# Patient Record
Sex: MALE | Race: WHITE | NOT HISPANIC OR LATINO | Employment: FULL TIME | URBAN - METROPOLITAN AREA
[De-identification: names, ages, dates, MRNs, and addresses within clinical notes are randomized per-mention and may not be internally consistent; named-entity substitution may affect disease eponyms.]

---
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
## 2019-02-04 ENCOUNTER — TELEPHONE (OUTPATIENT)
Dept: FAMILY MEDICINE CLINIC | Facility: CLINIC | Age: 19
End: 2019-02-04

## 2019-02-04 DIAGNOSIS — F90.0 ATTENTION DEFICIT HYPERACTIVITY DISORDER (ADHD), PREDOMINANTLY INATTENTIVE TYPE: Primary | ICD-10-CM

## 2019-02-04 RX ORDER — METHYLPHENIDATE HYDROCHLORIDE 27 MG/1
27 TABLET ORAL DAILY
Qty: 30 TABLET | Refills: 0 | Status: SHIPPED | OUTPATIENT
Start: 2019-02-04 | End: 2019-04-01

## 2019-02-04 NOTE — TELEPHONE ENCOUNTER
Please call pt re rx for adhd  Pt states psychologist was to have sent records to you to review the patient states that once reviewed you would rx his meds   Pt # [phone] or home # [phone]

## 2019-04-01 ENCOUNTER — OFFICE VISIT (OUTPATIENT)
Dept: FAMILY MEDICINE CLINIC | Facility: CLINIC | Age: 19
End: 2019-04-01
Payer: COMMERCIAL

## 2019-04-01 VITALS
DIASTOLIC BLOOD PRESSURE: 76 MMHG | OXYGEN SATURATION: 100 % | RESPIRATION RATE: 18 BRPM | HEART RATE: 88 BPM | HEIGHT: 70 IN | WEIGHT: 146.38 LBS | SYSTOLIC BLOOD PRESSURE: 118 MMHG | BODY MASS INDEX: 20.96 KG/M2

## 2019-04-01 DIAGNOSIS — F90.0 ATTENTION DEFICIT HYPERACTIVITY DISORDER (ADHD), PREDOMINANTLY INATTENTIVE TYPE: ICD-10-CM

## 2019-04-01 DIAGNOSIS — R41.840 ATTENTION AND CONCENTRATION DEFICIT: Primary | ICD-10-CM

## 2019-04-01 PROCEDURE — 99213 OFFICE O/P EST LOW 20 MIN: CPT | Performed by: NURSE PRACTITIONER

## 2019-04-01 PROCEDURE — 1036F TOBACCO NON-USER: CPT | Performed by: NURSE PRACTITIONER

## 2019-04-01 PROCEDURE — 3008F BODY MASS INDEX DOCD: CPT | Performed by: NURSE PRACTITIONER

## 2019-04-01 RX ORDER — METHYLPHENIDATE HYDROCHLORIDE 54 MG/1
54 TABLET ORAL DAILY
Qty: 30 TABLET | Refills: 0 | Status: SHIPPED | OUTPATIENT
Start: 2019-04-01 | End: 2019-05-08 | Stop reason: SDUPTHER

## 2019-05-08 ENCOUNTER — OFFICE VISIT (OUTPATIENT)
Dept: FAMILY MEDICINE CLINIC | Facility: CLINIC | Age: 19
End: 2019-05-08
Payer: COMMERCIAL

## 2019-05-08 VITALS
HEART RATE: 72 BPM | HEIGHT: 70 IN | OXYGEN SATURATION: 98 % | RESPIRATION RATE: 20 BRPM | BODY MASS INDEX: 20.76 KG/M2 | SYSTOLIC BLOOD PRESSURE: 122 MMHG | WEIGHT: 145 LBS | DIASTOLIC BLOOD PRESSURE: 72 MMHG

## 2019-05-08 DIAGNOSIS — R41.840 ATTENTION AND CONCENTRATION DEFICIT: ICD-10-CM

## 2019-05-08 DIAGNOSIS — F90.0 ATTENTION DEFICIT HYPERACTIVITY DISORDER (ADHD), PREDOMINANTLY INATTENTIVE TYPE: Primary | ICD-10-CM

## 2019-05-08 DIAGNOSIS — G47.9 SLEEP DISTURBANCE: ICD-10-CM

## 2019-05-08 PROCEDURE — 3008F BODY MASS INDEX DOCD: CPT | Performed by: NURSE PRACTITIONER

## 2019-05-08 PROCEDURE — 1036F TOBACCO NON-USER: CPT | Performed by: NURSE PRACTITIONER

## 2019-05-08 PROCEDURE — 99213 OFFICE O/P EST LOW 20 MIN: CPT | Performed by: NURSE PRACTITIONER

## 2019-05-08 RX ORDER — METHYLPHENIDATE HYDROCHLORIDE 54 MG/1
54 TABLET ORAL DAILY
Qty: 30 TABLET | Refills: 0 | Status: SHIPPED | OUTPATIENT
Start: 2019-05-08 | End: 2019-08-06 | Stop reason: SDUPTHER

## 2019-08-06 DIAGNOSIS — R41.840 ATTENTION AND CONCENTRATION DEFICIT: ICD-10-CM

## 2019-08-07 RX ORDER — METHYLPHENIDATE HYDROCHLORIDE 54 MG/1
54 TABLET ORAL DAILY
Qty: 30 TABLET | Refills: 0 | Status: SHIPPED | OUTPATIENT
Start: 2019-08-07 | End: 2020-08-03

## 2020-07-31 ENCOUNTER — OFFICE VISIT (OUTPATIENT)
Dept: URGENT CARE | Facility: MEDICAL CENTER | Age: 20
End: 2020-07-31
Payer: COMMERCIAL

## 2020-07-31 ENCOUNTER — APPOINTMENT (OUTPATIENT)
Dept: RADIOLOGY | Facility: MEDICAL CENTER | Age: 20
End: 2020-07-31
Payer: COMMERCIAL

## 2020-07-31 VITALS
TEMPERATURE: 98.4 F | DIASTOLIC BLOOD PRESSURE: 71 MMHG | WEIGHT: 155 LBS | BODY MASS INDEX: 22.19 KG/M2 | RESPIRATION RATE: 18 BRPM | HEART RATE: 72 BPM | HEIGHT: 70 IN | OXYGEN SATURATION: 98 % | SYSTOLIC BLOOD PRESSURE: 125 MMHG

## 2020-07-31 DIAGNOSIS — S62.313A CLOSED DISPLACED FRACTURE OF BASE OF THIRD METACARPAL BONE OF LEFT HAND, INITIAL ENCOUNTER: ICD-10-CM

## 2020-07-31 DIAGNOSIS — S69.92XA INJURY OF LEFT WRIST, INITIAL ENCOUNTER: Primary | ICD-10-CM

## 2020-07-31 DIAGNOSIS — S60.512A ABRASION OF LEFT HAND, INITIAL ENCOUNTER: ICD-10-CM

## 2020-07-31 DIAGNOSIS — S67.92XA: ICD-10-CM

## 2020-07-31 DIAGNOSIS — S69.92XA INJURY OF LEFT WRIST, INITIAL ENCOUNTER: ICD-10-CM

## 2020-07-31 PROCEDURE — 73110 X-RAY EXAM OF WRIST: CPT

## 2020-07-31 PROCEDURE — 73130 X-RAY EXAM OF HAND: CPT

## 2020-07-31 PROCEDURE — 99214 OFFICE O/P EST MOD 30 MIN: CPT | Performed by: NURSE PRACTITIONER

## 2020-07-31 RX ORDER — CEPHALEXIN 500 MG/1
500 CAPSULE ORAL EVERY 12 HOURS SCHEDULED
Qty: 14 CAPSULE | Refills: 0 | Status: SHIPPED | OUTPATIENT
Start: 2020-07-31 | End: 2020-08-07

## 2020-07-31 NOTE — PATIENT INSTRUCTIONS
Gentle stretches, gentle massage  May utilize tylenol or ibuprofen for discomfort  Apply ice locally  Elevate affected extremity when at rest    Advance activity as tolerated  Maintain good hygiene of site, wash and dry well  Utilize hypo-allergenic soap  ie Dove or Neutrogena  Apply Bacitracin and bandage daily and as needed  Complete course of antibiotics as prescribed  Wrist brace as discussed  Follow up with Ortho as discussed or go to nearest ED if exacerbation of symptoms  Abrasion   WHAT YOU NEED TO KNOW:   An abrasion is a scrape on your skin  It happens when your skin rubs against a rough surface  Some examples of an abrasion include rug burn, a skinned elbow, or road rash  Abrasions can be many shapes and sizes  The wound may hurt, bleed, bruise, or swell  DISCHARGE INSTRUCTIONS:   Return to the emergency department if:   · The bleeding does not stop after 10 minutes of firm pressure  · You cannot rinse one or more foreign objects out of your wound  · You have red streaks on your skin coming from your wound  Contact your healthcare provider if:   · You have a fever or chills  · Your abrasion is red, warm, swollen, or draining pus  · You have questions or concerns about your condition or care  Care for your abrasion:   · Wash your hands and dry them with a clean towel  · Press a clean cloth against your wound to stop any bleeding  · Rinse your wound with a lot of clean water  Do not use harsh soap, alcohol, or iodine solutions  · Use a clean, wet cloth to remove any objects, such as small pieces of rocks or dirt  · Rub antibiotic ointment on your wound  This may help prevent infection and help your wound heal     · Cover the wound with a non-stick bandage  Change the bandage daily, and if gets wet or dirty  Follow up with your healthcare provider as directed:  Write down your questions so you remember to ask them during your visits     © 2017 [employer] [facility] is for End User's use only and may not be sold, redistributed or otherwise used for commercial purposes  All illustrations and images included in CareNotes® are the copyrighted property of A D A M , Inc  or Tristian Cooper  The above information is an  only  It is not intended as medical advice for individual conditions or treatments  Talk to your doctor, nurse or pharmacist before following any medical regimen to see if it is safe and effective for you

## 2020-07-31 NOTE — TELEPHONE ENCOUNTER
[facility] now called to schedule patient  Patient has xrays of hand & wrist  Displaced fracture on lt hand 3rd digit & lt wrist was uncertain  Care now states splinting is difficult due to abrasions near the sites but they did what they could  Please advise if this patient should be seen today or if Monday in [facility] with Dr Macie Morales is okay  Please call patient to confirm that he is coming in on Monday or if he needs to be seen today   [phone]

## 2020-07-31 NOTE — LETTER
July 31, 2020     Patient: Juliette Stratton   YOB: 2000   Date of Visit: 7/31/2020       To Whom it May Concern:    Roc Hong was seen in my clinic on 7/31/2020  He may return to work on when cleared by [employer]       If you have any questions or concerns, please don't hesitate to call           Sincerely,          SOPHIA Rodriguez        CC: No Recipients
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
## 2020-08-03 VITALS
HEIGHT: 70 IN | WEIGHT: 156.4 LBS | SYSTOLIC BLOOD PRESSURE: 102 MMHG | BODY MASS INDEX: 22.39 KG/M2 | DIASTOLIC BLOOD PRESSURE: 72 MMHG | HEART RATE: 81 BPM

## 2020-08-03 DIAGNOSIS — S62.323A CLOSED DISPLACED FRACTURE OF SHAFT OF THIRD METACARPAL BONE OF LEFT HAND, INITIAL ENCOUNTER: Primary | ICD-10-CM

## 2020-08-03 PROCEDURE — 99204 OFFICE O/P NEW MOD 45 MIN: CPT | Performed by: ORTHOPAEDIC SURGERY

## 2020-08-03 PROCEDURE — 1036F TOBACCO NON-USER: CPT | Performed by: ORTHOPAEDIC SURGERY

## 2020-08-03 PROCEDURE — 3008F BODY MASS INDEX DOCD: CPT | Performed by: ORTHOPAEDIC SURGERY

## 2020-08-03 RX ORDER — ACETAMINOPHEN 500 MG
TABLET ORAL
Qty: 30 TABLET | Refills: 0 | Status: SHIPPED | OUTPATIENT
Start: 2020-08-03 | End: 2020-09-01

## 2020-08-03 RX ORDER — OXYCODONE HYDROCHLORIDE AND ACETAMINOPHEN 5; 325 MG/1; MG/1
1 TABLET ORAL EVERY 4 HOURS PRN
Qty: 20 TABLET | Refills: 0 | Status: SHIPPED | OUTPATIENT
Start: 2020-08-03 | End: 2020-08-20

## 2020-08-03 RX ORDER — IBUPROFEN 600 MG/1
TABLET ORAL
Qty: 30 TABLET | Refills: 0 | Status: SHIPPED | OUTPATIENT
Start: 2020-08-03 | End: 2020-09-01

## 2020-08-03 RX ORDER — CEFAZOLIN SODIUM 1 G/50ML
1000 SOLUTION INTRAVENOUS ONCE
Status: CANCELLED | OUTPATIENT
Start: 2020-08-03 | End: 2020-08-03
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
## 2020-08-03 NOTE — PRE-PROCEDURE INSTRUCTIONS
Pre-Surgery Instructions:   Medication Instructions    acetaminophen (TYLENOL) 500 mg tablet Instructed patient per Anesthesia Guidelines   cephalexin (KEFLEX) 500 mg capsule Instructed patient per Anesthesia Guidelines   ibuprofen (MOTRIN) 600 mg tablet Instructed patient per Anesthesia Guidelines   IBUPROFEN PO Instructed patient per Anesthesia Guidelines   oxyCODONE-acetaminophen (PERCOCET) 5-325 mg per tablet Instructed patient per Anesthesia Guidelines
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
## 2020-08-04 ENCOUNTER — ANESTHESIA EVENT (OUTPATIENT)
Dept: PERIOP | Facility: HOSPITAL | Age: 20
End: 2020-08-04
Payer: COMMERCIAL

## 2020-08-05 ENCOUNTER — APPOINTMENT (OUTPATIENT)
Dept: RADIOLOGY | Facility: HOSPITAL | Age: 20
End: 2020-08-05
Payer: COMMERCIAL

## 2020-08-05 ENCOUNTER — HOSPITAL ENCOUNTER (OUTPATIENT)
Facility: HOSPITAL | Age: 20
Setting detail: OUTPATIENT SURGERY
Discharge: HOME | End: 2020-08-05
Attending: ORTHOPAEDIC SURGERY | Admitting: ORTHOPAEDIC SURGERY
Payer: COMMERCIAL

## 2020-08-05 ENCOUNTER — ANESTHESIA (OUTPATIENT)
Dept: PERIOP | Facility: HOSPITAL | Age: 20
End: 2020-08-05
Payer: COMMERCIAL

## 2020-08-05 VITALS
SYSTOLIC BLOOD PRESSURE: 128 MMHG | RESPIRATION RATE: 15 BRPM | TEMPERATURE: 96.9 F | OXYGEN SATURATION: 100 % | HEIGHT: 70 IN | DIASTOLIC BLOOD PRESSURE: 60 MMHG | WEIGHT: 157.41 LBS | BODY MASS INDEX: 22.54 KG/M2 | HEART RATE: 55 BPM

## 2020-08-05 PROCEDURE — 73120 X-RAY EXAM OF HAND: CPT

## 2020-08-05 PROCEDURE — C1713 ANCHOR/SCREW BN/BN,TIS/BN: HCPCS | Performed by: ORTHOPAEDIC SURGERY

## 2020-08-05 PROCEDURE — 26615 TREAT METACARPAL FRACTURE: CPT | Performed by: ORTHOPAEDIC SURGERY

## 2020-08-05 PROCEDURE — 26615 TREAT METACARPAL FRACTURE: CPT | Performed by: PHYSICIAN ASSISTANT

## 2020-08-05 DEVICE — 2.3 MM X 12 MM HEXALOBE MULTISCREW
Type: IMPLANTABLE DEVICE | Site: HAND | Status: NON-FUNCTIONAL
Brand: ACUMED
Removed: 2020-10-21

## 2020-08-05 DEVICE — 2.3 MM X 13 MM HEXALOBE MULTISCREW
Type: IMPLANTABLE DEVICE | Site: HAND | Status: NON-FUNCTIONAL
Brand: ACUMED
Removed: 2020-10-21

## 2020-08-05 DEVICE — 2.3 MM X 11 MM HEXALOBE MULTISCREW
Type: IMPLANTABLE DEVICE | Site: HAND | Status: NON-FUNCTIONAL
Brand: ACUMED
Removed: 2020-10-21

## 2020-08-05 DEVICE — 1.3 MM STRAIGHT PLATE, 10 HOLE
Type: IMPLANTABLE DEVICE | Site: HAND | Status: NON-FUNCTIONAL
Brand: ACUMED
Removed: 2020-10-21

## 2020-08-05 RX ORDER — ROPIVACAINE HYDROCHLORIDE 5 MG/ML
INJECTION, SOLUTION EPIDURAL; INFILTRATION; PERINEURAL
Status: COMPLETED | OUTPATIENT
Start: 2020-08-05 | End: 2020-08-05

## 2020-08-05 RX ORDER — ONDANSETRON 2 MG/ML
4 INJECTION INTRAMUSCULAR; INTRAVENOUS EVERY 6 HOURS PRN
Status: DISCONTINUED | OUTPATIENT
Start: 2020-08-05 | End: 2020-08-05 | Stop reason: HOSPADM

## 2020-08-05 RX ORDER — FENTANYL CITRATE/PF 50 MCG/ML
50 SYRINGE (ML) INJECTION
Status: DISCONTINUED | OUTPATIENT
Start: 2020-08-05 | End: 2020-08-05 | Stop reason: HOSPADM

## 2020-08-05 RX ORDER — ONDANSETRON 2 MG/ML
4 INJECTION INTRAMUSCULAR; INTRAVENOUS ONCE AS NEEDED
Status: DISCONTINUED | OUTPATIENT
Start: 2020-08-05 | End: 2020-08-05 | Stop reason: HOSPADM

## 2020-08-05 RX ORDER — FENTANYL CITRATE 50 UG/ML
INJECTION, SOLUTION INTRAMUSCULAR; INTRAVENOUS
Status: COMPLETED | OUTPATIENT
Start: 2020-08-05 | End: 2020-08-05

## 2020-08-05 RX ORDER — TRAMADOL HYDROCHLORIDE 50 MG/1
50 TABLET ORAL EVERY 6 HOURS PRN
Status: DISCONTINUED | OUTPATIENT
Start: 2020-08-05 | End: 2020-08-05 | Stop reason: HOSPADM

## 2020-08-05 RX ORDER — MAGNESIUM HYDROXIDE 1200 MG/15ML
LIQUID ORAL AS NEEDED
Status: DISCONTINUED | OUTPATIENT
Start: 2020-08-05 | End: 2020-08-05 | Stop reason: HOSPADM

## 2020-08-05 RX ORDER — PROPOFOL 10 MG/ML
INJECTION, EMULSION INTRAVENOUS CONTINUOUS PRN
Status: DISCONTINUED | OUTPATIENT
Start: 2020-08-05 | End: 2020-08-05

## 2020-08-05 RX ORDER — SODIUM CHLORIDE, SODIUM LACTATE, POTASSIUM CHLORIDE, CALCIUM CHLORIDE 600; 310; 30; 20 MG/100ML; MG/100ML; MG/100ML; MG/100ML
125 INJECTION, SOLUTION INTRAVENOUS CONTINUOUS
Status: DISCONTINUED | OUTPATIENT
Start: 2020-08-05 | End: 2020-08-05 | Stop reason: HOSPADM

## 2020-08-05 RX ORDER — MIDAZOLAM HYDROCHLORIDE 2 MG/2ML
INJECTION, SOLUTION INTRAMUSCULAR; INTRAVENOUS
Status: COMPLETED | OUTPATIENT
Start: 2020-08-05 | End: 2020-08-05

## 2020-08-05 RX ORDER — ACETAMINOPHEN 325 MG/1
650 TABLET ORAL EVERY 6 HOURS PRN
Status: DISCONTINUED | OUTPATIENT
Start: 2020-08-05 | End: 2020-08-05 | Stop reason: HOSPADM

## 2020-08-05 RX ORDER — ONDANSETRON 2 MG/ML
INJECTION INTRAMUSCULAR; INTRAVENOUS AS NEEDED
Status: DISCONTINUED | OUTPATIENT
Start: 2020-08-05 | End: 2020-08-05

## 2020-08-05 RX ORDER — LIDOCAINE HYDROCHLORIDE 10 MG/ML
INJECTION, SOLUTION EPIDURAL; INFILTRATION; INTRACAUDAL; PERINEURAL
Status: COMPLETED | OUTPATIENT
Start: 2020-08-05 | End: 2020-08-05

## 2020-08-05 RX ORDER — CEFAZOLIN SODIUM 1 G/50ML
1000 SOLUTION INTRAVENOUS ONCE
Status: COMPLETED | OUTPATIENT
Start: 2020-08-05 | End: 2020-08-05

## 2020-08-05 RX ORDER — SODIUM CHLORIDE, SODIUM LACTATE, POTASSIUM CHLORIDE, CALCIUM CHLORIDE 600; 310; 30; 20 MG/100ML; MG/100ML; MG/100ML; MG/100ML
20 INJECTION, SOLUTION INTRAVENOUS CONTINUOUS
Status: CANCELLED | OUTPATIENT
Start: 2020-08-05

## 2020-08-05 RX ORDER — PROPOFOL 10 MG/ML
INJECTION, EMULSION INTRAVENOUS AS NEEDED
Status: DISCONTINUED | OUTPATIENT
Start: 2020-08-05 | End: 2020-08-05

## 2020-08-05 RX ORDER — LIDOCAINE HYDROCHLORIDE 20 MG/ML
INJECTION, SOLUTION INFILTRATION; PERINEURAL
Status: COMPLETED | OUTPATIENT
Start: 2020-08-05 | End: 2020-08-05

## 2020-08-05 RX ADMIN — ONDANSETRON 4 MG: 2 INJECTION INTRAMUSCULAR; INTRAVENOUS at 10:06

## 2020-08-05 RX ADMIN — MIDAZOLAM HYDROCHLORIDE 2 MG: 1 INJECTION, SOLUTION INTRAMUSCULAR; INTRAVENOUS at 08:40

## 2020-08-05 RX ADMIN — CEFAZOLIN SODIUM 1000 MG: 1 SOLUTION INTRAVENOUS at 09:42

## 2020-08-05 RX ADMIN — LIDOCAINE HYDROCHLORIDE 10 ML: 20 INJECTION, SOLUTION INFILTRATION; PERINEURAL at 08:40

## 2020-08-05 RX ADMIN — SODIUM CHLORIDE, SODIUM LACTATE, POTASSIUM CHLORIDE, AND CALCIUM CHLORIDE 125 ML/HR: .6; .31; .03; .02 INJECTION, SOLUTION INTRAVENOUS at 09:42

## 2020-08-05 RX ADMIN — PROPOFOL 100 MCG/KG/MIN: 10 INJECTION, EMULSION INTRAVENOUS at 10:06

## 2020-08-05 RX ADMIN — FENTANYL CITRATE 100 MCG: 50 INJECTION, SOLUTION INTRAMUSCULAR; INTRAVENOUS at 08:40

## 2020-08-05 RX ADMIN — PROPOFOL 50 MG: 10 INJECTION, EMULSION INTRAVENOUS at 10:05

## 2020-08-05 RX ADMIN — LIDOCAINE HYDROCHLORIDE 3 ML: 10 INJECTION, SOLUTION EPIDURAL; INFILTRATION; INTRACAUDAL; PERINEURAL at 08:40

## 2020-08-05 RX ADMIN — ROPIVACAINE HYDROCHLORIDE 20 ML: 5 INJECTION, SOLUTION EPIDURAL; INFILTRATION; PERINEURAL at 08:40

## 2020-08-05 NOTE — INTERVAL H&P NOTE
H&P reviewed  After examining the patient I find no changes in the patients condition since the H&P had been written      Vitals:    08/05/20 0829   BP: 143/79   Pulse: 79   Resp: 17   Temp: 98 °F (36 7 °C)   SpO2: 100%

## 2020-08-05 NOTE — DISCHARGE INSTRUCTIONS
Post Operative Instructions    You have had surgery on your arm today, please read and follow the information below:  · Elevate your hand above your elbow during the next 24-48 hours to help with swelling  · Place your hand and arm over your head with motion at your shoulder three times a day  · Do not apply any cream/ointment/oil to your incisions including antibiotics  · Do not soak your hands in standing water (dishwater, tubs, Jacuzzi's, pools, etc ) until given permission (typically 2-3 weeks after injury)    Call the office at [phone]  if you notice any:  · Increased numbness or tingling of your hand or fingers that is not relieved with elevation  · Increasing pain that is not controlled with medication  · Difficulty chewing, breathing, swallowing  · Chest pains or shortness of breath  · Fever over 101 4 degrees  Bandage: Your therapist will remove your bandage at your first therapy appointment  Motion: Move fingers into a fist 5 times a day, DO NOT move any splinted fingers  Weight bearing status: Avoid heavy lifting (>5 pounds) with the extremity that was operated on until follow up appointment  Normal activities of daily living are OK  Ice: Ice for 10 minutes every hour as needed for swelling x 24 hours  Sling: No sling necessary  Pain medication: A prescription for pain medication was provided in the office and sent to your pharmacy  Your prescription pain medication also contains Tylenol  Please limit total Tylenol dose to under 3,000 mg a day  After surgery, we would like you to take Ibuprofen 600mg one tablet by mouth every 6 hours with food (at breakfast, lunch and dinner)  AND Tylenol 500 mg one tablet by mouth every 6 hours  (at breakfast, lunch and dinner) for 5-7 days after your surgery  Please take these medication EVERYDAY after surgery for 5-7 days, and not just as needed  You can take these medications at the same time    Taking these medications after surgery will limit your need for prescription pain medication  If the pain becomes severe, and the pain medication is not alleviating symptoms, The greatest source of pain after surgery is usually a tight dressing due to increased swelling after surgery  If the pain becomes severe after surgery, and the patient medication is NOT alleviating the symptoms, the patient should do the following: The patient should  loosen the top dressing (usually coban or an ace bandage) and loosen/cut the rolled gauze beneath  The 4x4 gauze that is directly covering the incision should remain in place  The splint (if the patient has one) should remain in place  The ace bandage/coban can then be replaced on top in a less constrictive manor  If this does not help relieve the pain/numbness in a few hours, the patient should call our office (number listed below)  and we can have them seen in the office for further evaluation  Follow-up Appointment: 7-10 days with Dr Pratibha Barbosa  Occupational Therapy: 8/7/2020  [address] [place] [address], the patient may remove the splint/dressing for showering and clean the incision with soap and water  Keep incision dry after washing  Do not expose the incision to dirty water (oceans, pools, hot tubs, etc)     If you need help scheduling Therapy, you can call [phone]        Please call the office at [phone] if you have any questions or concerns regarding your post-operative care

## 2020-08-05 NOTE — ANESTHESIA PROCEDURE NOTES
Peripheral Block    Patient location during procedure: holding area  Start time: 8/5/2020 8:40 AM  Reason for block: at surgeon's request and post-op pain management  Staffing  Anesthesiologist: Vijay Tan MD  Performed: anesthesiologist   Preanesthetic Checklist  Completed: patient identified, site marked, surgical consent, pre-op evaluation, timeout performed, IV checked, risks and benefits discussed and monitors and equipment checked  Peripheral Block  Patient position: supine  Prep: ChloraPrep  Patient monitoring: continuous pulse ox, frequent blood pressure checks and cardiac monitor  Block type: supraclavicular  Laterality: left  Injection technique: single-shot  Procedures: ultrasound guided, Ultrasound guidance required for the procedure to increase accuracy and safety of medication placement and decrease risk of complications   and nerve stimulator  Ultrasound permanent image savedropivacaine (NAROPIN) 0 5 % perineural infiltration, 20 mL  midazolam (VERSED) 2 mg/2 mL IV, 2 mg  fentaNYL 50 mcg/mL IV, 100 mcg  lidocaine (PF) (XYLOCAINE-MPF) 1 % infiltration, 3 mL  lidocaine (XYLOCAINE) 2 % infiltration, 10 mL  Needle  Needle type: StimupAPProtect   Needle gauge: 22 G  Needle length: 5 cm  Needle localization: anatomical landmarks, nerve stimulator and ultrasound guidance  Needle insertion depth: 2 cm  Test dose: negative  Assessment  Injection assessment: incremental injection and local visualized surrounding nerve on ultrasound  Paresthesia pain: none  Heart rate change: no  Slow fractionated injection: yes  Post-procedure:  site cleaned  patient tolerated the procedure well with no immediate complications  Additional Notes  Lost hand twitch at  0 4mA

## 2020-08-05 NOTE — OP NOTE
OPERATIVE REPORT    PATIENT NAME: Calvin Morelos RECORD NO:  [id number]    PROCEDURE DATE:  20    :  2000    SURGEON:  ELA Cat , Ph D     Akilah Ghent:  Ana Houser PA-C    No qualified resident was available to assist for this surgery  A Physician Assistant was used to aid in reduction and retraction while the orthopedic hardware was placed  PREOPERATIVE DIAGNOSIS:    left 3rd metacarpal fracture    POSTOPERATIVE DIAGNOSIS:   left 3rd metacarpal fracture    PROCEDURE PERFORMED:   ORIF left 3rd metacarpal fracture    ANESTHESIA:  Regional and conscious sedation    COMPLICATIONS: none    TOURNIQUET TIME:  22 minutes at 250 mmHg     DISPOSITION: Patient was sent to the PACU in stable condition  INDICATIONS: Patient is a 20 y o  male who suffered a fracture of the left hand from a fall  X-rays revealed a fractures of the 3rd metacarpal   Patient was seen in the office with repeat x-rays demonstrating continued displacement of the fracture  Patient was counseled on treatment options  Given the fracture pattern open reduction and internal fixation was advised  Risks and benefits of the surgery were explained to the patient he did understand and wish to proceed  Procedure and Technique:  The patient was identified in the preoperative screening area  Consent was signed and verified after identifying the correct operative site  Patient was taken back to the operating room after receiving a regional block preop holding area  Regional and conscious sedation  was induced  Patient's left  upper extremity was then prepped and draped normal sterile fashion with chlorhexidine solution  The arm was then elevated exsanguinated with an Esmarch and a tourniquet placed about the brachium was inflated to 250 mm Hg  The Esmarch was removed       A 4 cm incision was made over thedorsum left hand centered over the 3rd metacarpal   Skin flaps were elevated up off the extensor fascia  The extensor fascia was then incised  The extensor tendons were kept out of harm's way  The periosteumover the 3rd metacarpal was then incised longitudinally  The fracture was readily identified  fracture was displaced with bayonetting of the distal fragment dorsal to the proximal fragment  The fracture was then reduced utilizing and provisionally fixed with k-wires  Once anatomically aligned the fracture fragments were then stabilized with the hand Accumed plating system straight plate cut to the appropriate 5-hole length  The plate was then secured into position using four 2 3 mm locking screws of appropriate length with 2 screws paced into each of the fracture fragments  Excellent reduction was achieved along with good positioning of the hardware  The wound was irrigated with copious amounts saline solution  The periosteum was then closed with 3 0 Ethibond suture interrupted figure-of-eight fashion  The tourniquet was released  Hemostasis was achieved  Skin was then closed with 4-0 nylon suture opted horizontal mattress fashion  Wound was then dressed in a sterile dressing  A dorsal gutter splint was then applied  Patient tolerated the procedure well without complication  Patient was awakened in the operating room and sent to the PACU in stable condition      I was present for the entire procedure     Patient Disposition:  PACU      SIGNATURE: Joe Santiago MD/PhD  DATE: 08/05/20  TIME:  10:48 AM

## 2020-08-05 NOTE — ANESTHESIA PREPROCEDURE EVALUATION
Procedure:  OPEN REDUCTION W/ INTERNAL FIXATION (ORIF) left hand 3rd metacarpal (Left Hand)    Relevant Problems   ANESTHESIA (within normal limits)      CARDIO (within normal limits)      ENDO (within normal limits)      GI/HEPATIC (within normal limits)      /RENAL (within normal limits)      GYN (within normal limits)      HEMATOLOGY (within normal limits)      MUSCULOSKELETAL (within normal limits)      NEURO/PSYCH (within normal limits)      PULMONARY (within normal limits)      Other   (+) Attention and concentration deficit   (+) Attention deficit hyperactivity disorder (ADHD), predominantly inattentive type             Anesthesia Plan  ASA Score- 2     Anesthesia Type- IV sedation with anesthesia with ASA Monitors  Additional Monitors:   Airway Plan:           Plan Factors-    Chart reviewed  Induction- intravenous  Postoperative Plan- Plan for postoperative opioid use  Informed Consent- Anesthetic plan and risks discussed with patient  I personally reviewed this patient with the CRNA  Discussed and agreed on the Anesthesia Plan with the CRNA  Andrew Golden

## 2020-08-05 NOTE — ANESTHESIA POSTPROCEDURE EVALUATION
Post-Op Assessment Note    CV Status:  Stable    Pain management: adequate     Mental Status:  Somnolent   Hydration Status:  Stable   PONV Controlled:  None   Airway Patency:  Patent      Post Op Vitals Reviewed: Yes      Staff: CRNA   Comments: o2 via mask        No complications documented      BP   100/49   Temp   97 3   Pulse  52   Resp   24   SpO2   97

## 2020-08-07 ENCOUNTER — EVALUATION (OUTPATIENT)
Dept: OCCUPATIONAL THERAPY | Facility: CLINIC | Age: 20
End: 2020-08-07
Payer: COMMERCIAL

## 2020-08-07 DIAGNOSIS — S62.323D CLOSED DISPLACED FRACTURE OF SHAFT OF THIRD METACARPAL BONE OF LEFT HAND WITH ROUTINE HEALING, SUBSEQUENT ENCOUNTER: ICD-10-CM

## 2020-08-07 PROCEDURE — 97110 THERAPEUTIC EXERCISES: CPT

## 2020-08-07 PROCEDURE — L3906 WHO W/O JOINTS CF: HCPCS

## 2020-08-07 PROCEDURE — 97165 OT EVAL LOW COMPLEX 30 MIN: CPT
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
## 2020-08-12 ENCOUNTER — OFFICE VISIT (OUTPATIENT)
Dept: OCCUPATIONAL THERAPY | Facility: CLINIC | Age: 20
End: 2020-08-12
Payer: COMMERCIAL

## 2020-08-12 DIAGNOSIS — S62.323D CLOSED DISPLACED FRACTURE OF SHAFT OF THIRD METACARPAL BONE OF LEFT HAND WITH ROUTINE HEALING, SUBSEQUENT ENCOUNTER: Primary | ICD-10-CM

## 2020-08-12 PROCEDURE — 97110 THERAPEUTIC EXERCISES: CPT

## 2020-08-12 PROCEDURE — 97763 ORTHC/PROSTC MGMT SBSQ ENC: CPT

## 2020-08-12 NOTE — PROGRESS NOTES
Daily Note     Today's date: 2020  Patient name: Alaina Spencer  : 2000  MRN: [id number]  Referring provider: Eric Hutton PA-C  Dx:   Encounter Diagnosis     ICD-10-CM    1  Closed displaced fracture of shaft of third metacarpal bone of left hand with routine healing, subsequent encounter  S60 [id number]                   Subjective: My splint is loose because the swelling went down      Objective: See treatment diary below  Pain level 2/10  Assessment: Tolerated treatment fair  Patient would benefit from continued OT  Patient demonstrates decreased edema in the left hand  Incision is well healed with no drainage  Patient is still unable to make a full fist or to fully extend the digits  Instructed patient in PROM of digits for HEP  AROM wrist and forearm WNL  Patient is only wearing the splint out of the house at this time  Plan: Continue per plan of care  Precautions: wound    Sx 20    Manuals            Dressing chg Adaptic, katia            Ortho fit/train  re molded           Edema massage  5'           Scar massage             Neuro Re-Ed                                                                                                        Ther Ex            TGE x10 x20           AROM thumb x10 x20           Isolated digit ext x10 x20           A/AAROM wrist x10 2'           AROM forearm x10 2'           PROM digits  10'                                     Ther Activity                                       Gait Training                                       Modalities             MHP  10' pre

## 2020-08-17 ENCOUNTER — OFFICE VISIT (OUTPATIENT)
Dept: OBGYN CLINIC | Facility: MEDICAL CENTER | Age: 20
End: 2020-08-17

## 2020-08-17 ENCOUNTER — APPOINTMENT (OUTPATIENT)
Dept: RADIOLOGY | Facility: MEDICAL CENTER | Age: 20
End: 2020-08-17
Payer: COMMERCIAL

## 2020-08-17 VITALS — BODY MASS INDEX: 22.55 KG/M2 | TEMPERATURE: 98.5 F | HEIGHT: 70 IN | WEIGHT: 157.5 LBS

## 2020-08-17 DIAGNOSIS — Z48.89 AFTERCARE FOLLOWING SURGERY: ICD-10-CM

## 2020-08-17 DIAGNOSIS — Z48.89 AFTERCARE FOLLOWING SURGERY: Primary | ICD-10-CM

## 2020-08-17 PROCEDURE — 73130 X-RAY EXAM OF HAND: CPT

## 2020-08-17 PROCEDURE — 3008F BODY MASS INDEX DOCD: CPT | Performed by: ORTHOPAEDIC SURGERY

## 2020-08-17 PROCEDURE — 99024 POSTOP FOLLOW-UP VISIT: CPT | Performed by: ORTHOPAEDIC SURGERY

## 2020-08-17 NOTE — PROGRESS NOTES
SUBJECTIVE:  Viktor Barker is a 20y o  year old male who presents for follow up after surgery, ORIF of the 3rd metacarpal of the left hand performed on  8/5/2020  He presents in a splint made in OT  He notes that his pain is well controlled with ibuprofen and Tylenol  He gets most of his pain and discomfort int he mornings due to hand stiffness  Today patient has some pain and stiffness  VITALS:  Vitals:    08/17/20 0813   Temp: 98 5 °F (36 9 °C)       PHYSICAL EXAMINATION:  General: well developed and well nourished, alert, oriented times 3 and appears comfortable  Psychiatric: Normal    MUSCULOSKELETAL EXAMINATION:  Right hand  Incision: Clean, dry, with sutures intact and swelling present  Range of Motion:  Left wrist stiffness secondary to dorsum hand swelling, ring and small fingers are 4+" from the distal almonte crease and index and long are 5" from the distal almonte crease  Neurovascular status: Neuro intact, good cap refill and radial pulse 2+      STUDIES REVIEWED:  Images obtained today of the left hand 3 views demonstrate plate is in good position      PROCEDURES PERFORMED:  Procedures  No Procedures performed today      ASSESSMENT/PLAN:    ORIF of the 3rd metacarpal of the left hand performed on  8/5/2020  * Done today: Sutures out  * Emphasized with the patient the importance of moving the hand and the fingers  Needs to get early motion or else scar tissue will set in and he will not get the motion back  * OT is unrestricted 2x/week preferred with HEP  FOLLOW UP:  Return in about 6 weeks (around 9/28/2020) for Left hand        TO DO AT NEXT VISIT:  Re-evaluation of current issue      Scribe Attestation    I,:   Odin Krishna am acting as a scribe while in the presence of the attending physician :        I,:   Isaiah Hi MD personally performed the services described in this documentation    as scribed in my presence :

## 2020-08-18 ENCOUNTER — OFFICE VISIT (OUTPATIENT)
Dept: OCCUPATIONAL THERAPY | Facility: CLINIC | Age: 20
End: 2020-08-18
Payer: COMMERCIAL

## 2020-08-18 DIAGNOSIS — S62.323D CLOSED DISPLACED FRACTURE OF SHAFT OF THIRD METACARPAL BONE OF LEFT HAND WITH ROUTINE HEALING, SUBSEQUENT ENCOUNTER: Primary | ICD-10-CM

## 2020-08-18 PROCEDURE — 97110 THERAPEUTIC EXERCISES: CPT

## 2020-08-18 PROCEDURE — 97140 MANUAL THERAPY 1/> REGIONS: CPT

## 2020-08-18 NOTE — PROGRESS NOTES
Daily Note     Today's date: 2020  Patient name: Angel Stephenson  : 2000  MRN: [id number]  Referring provider: Hari Skinner PA-C  Dx:   Encounter Diagnosis     ICD-10-CM    1  Closed displaced fracture of shaft of third metacarpal bone of left hand with routine healing, subsequent encounter  Y63 464N                   Subjective: My hand is more swollen so I've been icing it and resting and elevating  Objective: See treatment diary below                          MP             PIP           DIP        MCKEON  AROM index    0/55         0/100         0/75        230 (improved 110)           middle -15/55         0/110         0/80        230 ( improved 95)             ring    -         0/110         0/80        225 ( improved 75)          small       0/70         0/105          0/80        255 ( improved 95)    Assessment: Tolerated treatment fair  Patient exhibited good technique with therapeutic exercises  Sutures removed and incision is well healed  Localized edema over the dorsum of the left hand with mild redness and skin warmth  Patient running a low grade fever of 110 3 F  Patient stated he did not complete his course of antibiotics following surgery  Dr Simón Cartwright notified of patient's status  AROM of digits has improved  Issued ribbed foam pad to wear on dorsum of hand with size E tubigrip to reduce edema  Plan: Continue per plan of care  Precautions: wound    Sx 20    Manuals           Dressing chg Adaptic, katia  Rib foam,tg          Ortho fit/train  re molded           Edema massage  5' 5'          Scar massage   5'          Neuro Re-Ed                                                                                                        Ther Ex           TGE x10 x20 x20          AROM thumb x10 x20           Isolated digit ext x10 x20 x20          A/AAROM wrist x10 2' x20          AROM forearm x10 2'           PROM digits  10' 15'                                    Ther Activity                                       Gait Training                                       Modalities  8/12           MHP  10' pre

## 2020-08-20 ENCOUNTER — OFFICE VISIT (OUTPATIENT)
Dept: OBGYN CLINIC | Facility: CLINIC | Age: 20
End: 2020-08-20

## 2020-08-20 VITALS
HEART RATE: 66 BPM | HEIGHT: 70 IN | WEIGHT: 157.8 LBS | DIASTOLIC BLOOD PRESSURE: 68 MMHG | SYSTOLIC BLOOD PRESSURE: 113 MMHG | BODY MASS INDEX: 22.59 KG/M2

## 2020-08-20 DIAGNOSIS — Z48.89 AFTERCARE FOLLOWING SURGERY: Primary | ICD-10-CM

## 2020-08-20 PROCEDURE — 3008F BODY MASS INDEX DOCD: CPT | Performed by: ORTHOPAEDIC SURGERY

## 2020-08-20 PROCEDURE — 99024 POSTOP FOLLOW-UP VISIT: CPT | Performed by: ORTHOPAEDIC SURGERY

## 2020-08-20 RX ORDER — CEPHALEXIN 500 MG/1
500 CAPSULE ORAL EVERY 12 HOURS SCHEDULED
Qty: 10 CAPSULE | Refills: 0 | Status: SHIPPED | OUTPATIENT
Start: 2020-08-20 | End: 2020-08-25

## 2020-08-20 NOTE — PROGRESS NOTES
CHIEF COMPLAINT:  Chief Complaint   Patient presents with    Left Hand - Post-op       SUBJECTIVE:  Jose Angel Walsh is a 20y o  year old male who presents for follow-up regarding s/p ORIF of 3rd metacarpal left hand performed on 08/05/2020  Patient states that he stopped taking his antibiotics and started to note increased swelling and some redness over the incision  Picture was obtained at OT and sent to the office which was reviewed in the patient was informed to come in  He states that his swelling has gone down and he started his antibiotics again  PAST MEDICAL HISTORY:  Past Medical History:   Diagnosis Date    Seasonal allergies        PAST SURGICAL HISTORY:  Past Surgical History:   Procedure Laterality Date    NO PAST SURGERIES      VT OPEN TX METACARPAL FRACTURE SINGLE EA BONE Left 8/5/2020    Procedure: OPEN REDUCTION W/ INTERNAL FIXATION (ORIF) left hand 3rd metacarpal;  Surgeon: Shoaib Paredes MD;  Location: [facility];  Service: Orthopedics       FAMILY HISTORY:  History reviewed  No pertinent family history  SOCIAL HISTORY:  Social History     Tobacco Use    Smoking status: Never Smoker    Smokeless tobacco: Never Used   Substance Use Topics    Alcohol use: No    Drug use: Yes     Types: Marijuana     Comment: pt states he doesnt smoke       MEDICATIONS:    Current Outpatient Medications:     acetaminophen (TYLENOL) 500 mg tablet, Take 1 500mg tablet in the morning prior to surgery, then 1 tablet every 6 hours for 5-7 days  , Disp: 30 tablet, Rfl: 0    ibuprofen (MOTRIN) 600 mg tablet, Take 1 600mg tablet in the morning prior to surgery, then 1 tablet every 6 hours for 5-7 days  , Disp: 30 tablet, Rfl: 0    cephalexin (KEFLEX) 500 mg capsule, Take 1 capsule (500 mg total) by mouth every 12 (twelve) hours for 5 days, Disp: 10 capsule, Rfl: 0    ALLERGIES:  No Known Allergies    REVIEW OF SYSTEMS:  Review of Systems  ROS:   General: no fever, no chills  HEENT:  No loss of hearing or eyesight problems  Eyes:  No red eyes  Respiratory:  No coughing, shortness of breath or wheezing  Cardiovascular:  No chest pain, no palpitations  GI:  Abdomen soft nontender, denies nausea  Endocrine:  No muscle weakness, no frequent urination, no excessive thirst  Urinary:  No dysuria, no incontinence  Musculoskeletal: see HPI and PE  SKIN:  No skin rash, no dry skin  Neurological:  No headaches, no confusion  Psychiatric:  No suicide thoughts, no anxiety, no depression  Review of all other systems is negative    VITALS:  Vitals:    08/20/20 1005   BP: 113/68   Pulse: 66       LABS:  HgA1c: No results found for: HGBA1C  BMP: No results found for: GLUCOSE, CALCIUM, NA, K, CO2, CL, BUN, CREATININE    _____________________________________________________  PHYSICAL EXAMINATION:  General: well developed and well nourished, alert, oriented times 3 and appears comfortable  Psychiatric: Normal  HEENT: Trachea Midline, No torticollis  Pulmonary: No audible wheezing or respiratory distress   Skin: No masses, erythema, lacerations, fluctation, ulcerations  Neurovascular: Sensation Intact to the Median, Ulnar, Radial Nerve, Motor Intact to the Median, Ulnar, Radial Nerve and Pulses Intact    MUSCULOSKELETAL EXAMINATION:  Left hand  Mild swelling and erythema noted over the dorsal aspect of the hand, no ecchymosis noted  Small amount of fluctuance appreciated over the dorsal aspect of the hand at the incision  Incision is well-healed with no purulent drainage noted  Patient has some stiffness while trying to make a full composite fist  Patient is neurovascular intact    ___________________________________________________  STUDIES REVIEWED:  No studies reviewed         PROCEDURES PERFORMED:  Procedures    no procedures were performed today    _____________________________________________________  ASSESSMENT/PLAN:    S/P left 3rd metacarpal ORIF performed on 08/05/2020, superficial skin infection  - aspiration about 0 5 cc of bloody serous sanguinous fluid was obtained  - patient was given additional 5 days of antibiotics to be taken orally  - he will follow up in 1 week for re-evaluation  - he is to continue going to OT to work on his range of motion      Follow Up:  Return in about 1 week (around 8/27/2020)      Work/school status:  No restrictions    To Do Next Visit:  Re-evaluation of current issue      Scribe Attestation    I,:   Lynette Lawrence PA-C am acting as a scribe while in the presence of the attending physician :        I,:   Alfredito Hurd MD personally performed the services described in this documentation    as scribed in my presence :

## 2020-08-25 ENCOUNTER — OFFICE VISIT (OUTPATIENT)
Dept: OCCUPATIONAL THERAPY | Facility: CLINIC | Age: 20
End: 2020-08-25
Payer: COMMERCIAL

## 2020-08-25 DIAGNOSIS — S62.323D CLOSED DISPLACED FRACTURE OF SHAFT OF THIRD METACARPAL BONE OF LEFT HAND WITH ROUTINE HEALING, SUBSEQUENT ENCOUNTER: Primary | ICD-10-CM

## 2020-08-25 PROCEDURE — 97140 MANUAL THERAPY 1/> REGIONS: CPT

## 2020-08-25 PROCEDURE — 97110 THERAPEUTIC EXERCISES: CPT

## 2020-08-25 NOTE — PROGRESS NOTES
Daily Note     Today's date: 2020  Patient name: Uday Arellano  : 2000  MRN: [id number]  Referring provider: Shu Rob PA-C  Dx:   Encounter Diagnosis     ICD-10-CM    1  Closed displaced fracture of shaft of third metacarpal bone of left hand with routine healing, subsequent encounter  S62 278D                   Subjective: I got a little more fluid out of my hand, but not much  I think I'm moving better  Objective: See treatment diary below                        MP              PIP             DIP              MCKEON  AROM LIF    0/70            0/110          0/80               260            LMF  -20           0/110          0/85               245              LRF  -15/80           0/100          0/85               250            LSF    090            0/105          0/80               275  Assessment: Tolerated treatment well  Patient demonstrates improved AROM in digits  Proximal end of scar is soft and has fluid, but none of the fluid is draining  Plan: Continue per plan of care  Precautions: wound    Sx 20    Manuals          Dressing chg Adaptic, katia  Rib foam,tg          Ortho fit/train  re molded           IASTM    5' intrinsics         Edema massage  5' 5' 5'         Scar massage   5' 5'         Neuro Re-Ed                                                                                                        Ther Ex          TGE x10 x20 x20 x20         AROM thumb x10 x20  x10         Isolated digit ext x10 x20 x20 x20         A/AAROM wrist x10 2' x20          AROM forearm x10 2'           PROM digits  10' 15' 20'                                   Ther Activity                                       Gait Training                                       Modalities           MHP  10' pre  5'

## 2020-08-28 ENCOUNTER — OFFICE VISIT (OUTPATIENT)
Dept: OCCUPATIONAL THERAPY | Facility: CLINIC | Age: 20
End: 2020-08-28
Payer: COMMERCIAL

## 2020-08-28 DIAGNOSIS — S62.323D CLOSED DISPLACED FRACTURE OF SHAFT OF THIRD METACARPAL BONE OF LEFT HAND WITH ROUTINE HEALING, SUBSEQUENT ENCOUNTER: Primary | ICD-10-CM

## 2020-08-28 PROCEDURE — 97110 THERAPEUTIC EXERCISES: CPT

## 2020-08-28 PROCEDURE — 97140 MANUAL THERAPY 1/> REGIONS: CPT

## 2020-08-28 NOTE — PROGRESS NOTES
Daily Note     Today's date: 2020  Patient name: Jose Angel Walsh  : 2000  MRN: [id number]  Referring provider: Carlos Corona PA-C  Dx:   Encounter Diagnosis     ICD-10-CM    1  Closed displaced fracture of shaft of third metacarpal bone of left hand with routine healing, subsequent encounter  S62 653D                   Subjective: My hand is moving better, but it feels like a blister at the end of my scar    Objective: See treatment diary below      Assessment: Tolerated treatment well  Patient exhibited good technique with therapeutic exercises  Scar has localized pocket of fluid on proximal end  No drainage  No erythema  Patient is not running a fever  Therapist did not find any sutures in the area  Patient instructed to go to urgent care if the area becomes red, warm,or edematous  Clean area with soap and water  Patient able to make full composite fist   10 degree extension lag at MP joints ring long fingers      Plan: Continue per plan of care  Precautions: wound    Sx 20    Manuals         Dressing chg Adaptic, katia  Rib foam,tg          Ortho fit/train  re molded           IASTM    5' intrinsics         Edema massage  5' 5' 5' 5'        Scar massage   5' 5' 5'        Neuro Re-Ed                                                                                                        Ther Ex         TGE x10 x20 x20 x20 x20        AROM thumb x10 x20  x10         Isolated digit ext x10 x20 x20 x20 x30        A/AAROM wrist x10 2' x20          AROM forearm x10 2'           PROM digits  10' 15' 20' 10'                                  Ther Activity                                       Gait Training                                       Modalities          MHP  10' pre  5' 10'

## 2020-09-01 ENCOUNTER — OFFICE VISIT (OUTPATIENT)
Dept: OBGYN CLINIC | Facility: CLINIC | Age: 20
End: 2020-09-01

## 2020-09-01 VITALS
BODY MASS INDEX: 22.59 KG/M2 | HEIGHT: 70 IN | SYSTOLIC BLOOD PRESSURE: 111 MMHG | DIASTOLIC BLOOD PRESSURE: 68 MMHG | HEART RATE: 54 BPM | WEIGHT: 157.8 LBS

## 2020-09-01 DIAGNOSIS — B99.9 INFECTION: Primary | ICD-10-CM

## 2020-09-01 PROCEDURE — 99024 POSTOP FOLLOW-UP VISIT: CPT | Performed by: ORTHOPAEDIC SURGERY

## 2020-09-01 RX ORDER — CEPHALEXIN 500 MG/1
500 CAPSULE ORAL EVERY 6 HOURS SCHEDULED
Qty: 40 CAPSULE | Refills: 0 | Status: SHIPPED | OUTPATIENT
Start: 2020-09-01 | End: 2020-09-11

## 2020-09-01 NOTE — LETTER
September 1, 2020     Patient: Arthur Pierre   YOB: 2000   Date of Visit: 9/1/2020       To Whom it May Concern:    Shama Blood is under my professional care  He was seen in my office on 9/1/2020  He may not return to work until cleared by [employer]  If you have any questions or concerns, please don't hesitate to call           Sincerely,          Jonatan Phan MD        CC: No Recipients

## 2020-09-01 NOTE — PROGRESS NOTES
CHIEF COMPLAINT:  Chief Complaint   Patient presents with    Left Hand - Post-op       SUBJECTIVE:  Mini Owens is a 20y o  year old  male who presents to the office for a follow-up after left 3rd metacarpal ORIF performed 08/05/2020  Today patient states that the therapist had provided him with a compression sleeve for the fullness in his left hand  Patient states that the compression sleeve did resolve the fullness although there is a fluctuant fullness noted the proximal aspect of dorsal incision  Patient states that he has not had any increased pain  Patient denies drainage or erythema  Patient states that he did finish his antibiotics a few days ago  PAST MEDICAL HISTORY:  Past Medical History:   Diagnosis Date    Seasonal allergies        PAST SURGICAL HISTORY:  Past Surgical History:   Procedure Laterality Date    NO PAST SURGERIES      SD OPEN TX METACARPAL FRACTURE SINGLE EA BONE Left 8/5/2020    Procedure: OPEN REDUCTION W/ INTERNAL FIXATION (ORIF) left hand 3rd metacarpal;  Surgeon: Cassie Hawkins MD;  Location: [facility];  Service: Orthopedics       FAMILY HISTORY:  History reviewed  No pertinent family history  SOCIAL HISTORY:  Social History     Tobacco Use    Smoking status: Never Smoker    Smokeless tobacco: Never Used   Substance Use Topics    Alcohol use: No    Drug use: Yes     Types: Marijuana     Comment: pt states he doesnt smoke       MEDICATIONS:    Current Outpatient Medications:     cephalexin (KEFLEX) 500 mg capsule, Take 1 capsule (500 mg total) by mouth every 6 (six) hours for 10 days, Disp: 40 capsule, Rfl: 0    ALLERGIES:  No Known Allergies    REVIEW OF SYSTEMS:  Review of Systems   Constitutional: Negative for chills, fever and unexpected weight change  HENT: Negative for hearing loss, nosebleeds and sore throat  Eyes: Negative for pain, redness and visual disturbance  Respiratory: Negative for cough, shortness of breath and wheezing  Cardiovascular: Negative for chest pain, palpitations and leg swelling  Gastrointestinal: Negative for abdominal pain, nausea and vomiting  Endocrine: Negative for polydipsia and polyuria  Genitourinary: Negative for dysuria and hematuria  Skin: Negative for rash and wound  Neurological: Negative for dizziness, light-headedness and headaches  Psychiatric/Behavioral: Negative for decreased concentration, dysphoric mood and suicidal ideas  The patient is not nervous/anxious          VITALS:  Vitals:    09/01/20 1007   BP: 111/68   Pulse: (!) 54       LABS:  HgA1c: No results found for: HGBA1C  BMP: No results found for: GLUCOSE, CALCIUM, NA, K, CO2, CL, BUN, CREATININE    _____________________________________________________  PHYSICAL EXAMINATION:  General: well developed and well nourished, alert, oriented times 3 and appears comfortable  Psychiatric: Normal  HEENT: Trachea Midline, No torticollis  Pulmonary: No audible wheezing or strider  Cardiovascular: No discernable arrhythmia   Skin: No masses, erythema, lacerations, fluctation, ulcerations  Neurovascular: Sensation Intact to the Median, Ulnar, Radial Nerve, Motor Intact to the Median, Ulnar, Radial Nerve and Pulses Intact    MUSCULOSKELETAL EXAMINATION:  Left hand  Palpable fluctuant area of fullness at the proximal aspect of incision  No active drainage  No erythema  Noted purple hue      ___________________________________________________  STUDIES REVIEWED:  No images reviewed today      PROCEDURES PERFORMED:  Procedures    Debridement of unhealthy tissue with use of forceps and scissors and washout using copious amounts of sterile saline and syringe     _____________________________________________________  ASSESSMENT/PLAN:    S/P left 3rd metacarpal ORIF- with possible infection   * debridement and washout were performed in office   * Pt was advised he may wash with soap and water but otherwise keep clean and dry  * Pt was advised to thoroughly dry prior to putting on clean bandage as shown in the office today  * patient was advised to change bandages twice a day  * Keflex prescription was sent patient's pharmacy on file  * patient was advised to call the office immediately he notes increased drainage, malodor, increased pain, any signs of infection, fever feeling un well without known cause  * patient was advised to avoid any topical ointment, Band-Aids, peroxide, and rubbing alcohol  * patient was advised to avoid submersion of his hand in any liquids        Follow Up:  Return in about 1 week (around 9/8/2020)      Work/school status:  No work until cleared     To Do Next Visit:  Re-evaluation of current issue    Scribe Attestation    I,:   Mari Felt am acting as a scribe while in the presence of the attending physician :        I,:   Paul Hooker MD personally performed the services described in this documentation    as scribed in my presence :

## 2020-09-08 ENCOUNTER — OFFICE VISIT (OUTPATIENT)
Dept: OBGYN CLINIC | Facility: CLINIC | Age: 20
End: 2020-09-08

## 2020-09-08 VITALS
WEIGHT: 157.8 LBS | HEIGHT: 70 IN | SYSTOLIC BLOOD PRESSURE: 112 MMHG | DIASTOLIC BLOOD PRESSURE: 70 MMHG | BODY MASS INDEX: 22.59 KG/M2 | HEART RATE: 74 BPM

## 2020-09-08 DIAGNOSIS — Z48.89 AFTERCARE FOLLOWING SURGERY: ICD-10-CM

## 2020-09-08 DIAGNOSIS — B99.9 INFECTION: Primary | ICD-10-CM

## 2020-09-08 PROCEDURE — 99024 POSTOP FOLLOW-UP VISIT: CPT | Performed by: ORTHOPAEDIC SURGERY

## 2020-09-08 NOTE — PROGRESS NOTES
CHIEF COMPLAINT:  Chief Complaint   Patient presents with    Left Hand - Follow-up       SUBJECTIVE:  Maykel Stubbs is a 20y o  year old  male who presents to the office for a follow up for post op infection after left 3rd metacarpal ORIF  Pt was seen in the office 9/1/2020 where washout was performed  Pt states that he has been washing with soap and water as advised  Pt states that he has not expressed much drainage  Pt has been taking his ABX as advvised and has 4 days left  PAST MEDICAL HISTORY:  Past Medical History:   Diagnosis Date    Seasonal allergies        PAST SURGICAL HISTORY:  Past Surgical History:   Procedure Laterality Date    NO PAST SURGERIES      [place] OPEN TX METACARPAL FRACTURE SINGLE EA BONE Left 8/5/2020    Procedure: OPEN REDUCTION W/ INTERNAL FIXATION (ORIF) left hand 3rd metacarpal;  Surgeon: Chasidy Ross MD;  Location: [facility];  Service: Orthopedics       FAMILY HISTORY:  History reviewed  No pertinent family history  SOCIAL HISTORY:  Social History     Tobacco Use    Smoking status: Never Smoker    Smokeless tobacco: Never Used   Substance Use Topics    Alcohol use: No    Drug use: Yes     Types: Marijuana     Comment: pt states he doesnt smoke       MEDICATIONS:    Current Outpatient Medications:     cephalexin (KEFLEX) 500 mg capsule, Take 1 capsule (500 mg total) by mouth every 6 (six) hours for 10 days, Disp: 40 capsule, Rfl: 0    ALLERGIES:  No Known Allergies    REVIEW OF SYSTEMS:  Review of Systems   Constitutional: Negative for chills, fever and unexpected weight change  HENT: Negative for hearing loss, nosebleeds and sore throat  Eyes: Negative for pain, redness and visual disturbance  Respiratory: Negative for cough, shortness of breath and wheezing  Cardiovascular: Negative for chest pain, palpitations and leg swelling  Gastrointestinal: Negative for abdominal pain, nausea and vomiting  Endocrine: Negative for polydipsia and polyuria  Genitourinary: Negative for dysuria and hematuria  Skin: Negative for rash and wound  Neurological: Negative for dizziness, light-headedness and headaches  Psychiatric/Behavioral: Negative for decreased concentration, dysphoric mood and suicidal ideas  The patient is not nervous/anxious  VITALS:  Vitals:    09/08/20 0947   BP: 112/70   Pulse: 74       LABS:  HgA1c: No results found for: HGBA1C  BMP: No results found for: GLUCOSE, CALCIUM, NA, K, CO2, CL, BUN, CREATININE    _____________________________________________________  PHYSICAL EXAMINATION:  General: well developed and well nourished, alert, oriented times 3 and appears comfortable  Psychiatric: Normal  HEENT: Trachea Midline, No torticollis  Pulmonary: No audible wheezing or strider  Cardiovascular: No discernable arrhythmia   Skin: No Masses, No Erythema  Neurovascular: Sensation Intact to the Median, Ulnar, Radial Nerve, Motor Intact to the Median, Ulnar, Radial Nerve and Pulses Intact    MUSCULOSKELETAL EXAMINATION:  Left hand  Fluctuance noted at proximal aspect of incision  seropurulent drainage expressed   No erythema   Eschar noted       ___________________________________________________  STUDIES REVIEWED:  No studies reviewed         PROCEDURES PERFORMED:  Procedures  No Procedures performed today    ____________________________________________________  ASSESSMENT/PLAN:    S/P left 3rd metacarpal ORIF- with possible infection  * patient was advised to continue to wash with soap and water and to remove eschar to help with drainage   * patient was advised to continue antibiotics and change bandage twice a day  * * patient was advised to call the office immediately he notes increased drainage, malodor, increased pain, any signs of infection, fever feeling un well without known cause  * patient was advised to avoid any topical ointment, Band-Aids, peroxide, and rubbing alcohol  * patient was advised to avoid submersion of his hand in any liquids      Follow Up:  Return in about 1 week (around 9/15/2020)        To Do Next Visit:  Re-evaluation of current issue      Scribe Attestation    I,:   Arjun Law am acting as a scribe while in the presence of the attending physician :        I,:   Gregoria Burch MD personally performed the services described in this documentation    as scribed in my presence :

## 2020-09-15 ENCOUNTER — OFFICE VISIT (OUTPATIENT)
Dept: OBGYN CLINIC | Facility: CLINIC | Age: 20
End: 2020-09-15

## 2020-09-15 VITALS
HEIGHT: 70 IN | WEIGHT: 157.8 LBS | BODY MASS INDEX: 22.59 KG/M2 | HEART RATE: 58 BPM | DIASTOLIC BLOOD PRESSURE: 68 MMHG | SYSTOLIC BLOOD PRESSURE: 106 MMHG

## 2020-09-15 DIAGNOSIS — Z87.81 S/P ORIF (OPEN REDUCTION INTERNAL FIXATION) FRACTURE: Primary | ICD-10-CM

## 2020-09-15 DIAGNOSIS — Z98.890 S/P ORIF (OPEN REDUCTION INTERNAL FIXATION) FRACTURE: Primary | ICD-10-CM

## 2020-09-15 PROCEDURE — 99024 POSTOP FOLLOW-UP VISIT: CPT | Performed by: ORTHOPAEDIC SURGERY

## 2020-09-15 NOTE — LETTER
September 15, 2020     Patient: Lulu Eisenberg   YOB: 2000   Date of Visit: 9/15/2020       To Whom it May Concern:    Luke Reardon is under my professional care  He was seen in my office on 9/15/2020  He may return to work 9/16/2020 no lifting greater than 10 lbs  If you have any questions or concerns, please don't hesitate to call           Sincerely,          Lc Dotson MD        CC: No Recipients

## 2020-09-15 NOTE — PROGRESS NOTES
SUBJECTIVE:  Barbara Camarena is a 20y o  year old male who presents for follow up postop infection after surgery, left 3rd metacarpal ORIF performed on  8/5/2020  Today patient has no significant complaints of pain  Patient states he is no longer having drainage a couple extension states he does continue to soak as advised  Patient has finished antibiotics  VITALS:  Vitals:    09/15/20 1022   BP: 106/68   Pulse: 58       PHYSICAL EXAMINATION:  General: well developed and well nourished, alert, oriented times 3 and appears comfortable  Psychiatric: Normal    MUSCULOSKELETAL EXAMINATION:  Left hand   No erythema or ecchymosis  Firm palpable scar tissue  No palpable fluctuance  Eschar noted at the proximal aspect of incision otherwise is well healed      STUDIES REVIEWED:  No studies reviewed  PROCEDURES PERFORMED:  Procedures  No Procedures performed today      ASSESSMENT/PLAN:    S/P ORIF left 3rd metacarpal - infection resolved   * Pt was advised to call thew office if he has return of swelling or drainage  * Pt was advised to follow up in the office in 6 weeks   * Pt was provided with a note to return to work although no lifting greater than 10 lbs    FOLLOW UP:  Return in about 6 weeks (around 10/27/2020)        TO DO AT NEXT VISIT:  Re-evaluation of current issue xr left hand       Scribe Attestation    I,:   Randell Navarro am acting as a scribe while in the presence of the attending physician :        I,:   Collin Hoff MD personally performed the services described in this documentation    as scribed in my presence :

## 2020-10-02 ENCOUNTER — TELEPHONE (OUTPATIENT)
Dept: OBGYN CLINIC | Facility: HOSPITAL | Age: 20
End: 2020-10-02

## 2020-10-06 ENCOUNTER — APPOINTMENT (OUTPATIENT)
Dept: RADIOLOGY | Facility: CLINIC | Age: 20
End: 2020-10-06
Payer: COMMERCIAL

## 2020-10-06 ENCOUNTER — OFFICE VISIT (OUTPATIENT)
Dept: OBGYN CLINIC | Facility: CLINIC | Age: 20
End: 2020-10-06

## 2020-10-06 VITALS
SYSTOLIC BLOOD PRESSURE: 124 MMHG | TEMPERATURE: 98.1 F | HEIGHT: 70 IN | WEIGHT: 157.8 LBS | HEART RATE: 67 BPM | BODY MASS INDEX: 22.59 KG/M2 | DIASTOLIC BLOOD PRESSURE: 76 MMHG

## 2020-10-06 DIAGNOSIS — Z87.81 S/P ORIF (OPEN REDUCTION INTERNAL FIXATION) FRACTURE: Primary | ICD-10-CM

## 2020-10-06 DIAGNOSIS — Z98.890 S/P ORIF (OPEN REDUCTION INTERNAL FIXATION) FRACTURE: Primary | ICD-10-CM

## 2020-10-06 DIAGNOSIS — B99.9 INFECTION: ICD-10-CM

## 2020-10-06 DIAGNOSIS — Z98.890 S/P ORIF (OPEN REDUCTION INTERNAL FIXATION) FRACTURE: ICD-10-CM

## 2020-10-06 DIAGNOSIS — Z87.81 S/P ORIF (OPEN REDUCTION INTERNAL FIXATION) FRACTURE: ICD-10-CM

## 2020-10-06 PROCEDURE — 73130 X-RAY EXAM OF HAND: CPT

## 2020-10-06 PROCEDURE — 99024 POSTOP FOLLOW-UP VISIT: CPT | Performed by: ORTHOPAEDIC SURGERY

## 2020-10-06 RX ORDER — ACETAMINOPHEN 500 MG
TABLET ORAL
Qty: 30 TABLET | Refills: 0 | Status: SHIPPED | OUTPATIENT
Start: 2020-10-06

## 2020-10-06 RX ORDER — IBUPROFEN 600 MG/1
TABLET ORAL
Qty: 30 TABLET | Refills: 0 | Status: SHIPPED | OUTPATIENT
Start: 2020-10-06

## 2020-10-06 NOTE — PROGRESS NOTES
10/6/20:  Patient was able to achieve full ROM in digits  He was closely followed by surgeon for hand infection  Patient scheduled for hardware removal   Dc to [facility]

## 2020-10-20 ENCOUNTER — ANESTHESIA EVENT (OUTPATIENT)
Dept: PERIOP | Facility: HOSPITAL | Age: 20
End: 2020-10-20
Payer: COMMERCIAL

## 2020-10-20 RX ORDER — PROMETHAZINE HYDROCHLORIDE 25 MG/ML
12.5 INJECTION, SOLUTION INTRAMUSCULAR; INTRAVENOUS ONCE AS NEEDED
Status: CANCELLED | OUTPATIENT
Start: 2020-10-20

## 2020-10-20 RX ORDER — HYDROMORPHONE HCL/PF 1 MG/ML
0.5 SYRINGE (ML) INJECTION
Status: CANCELLED | OUTPATIENT
Start: 2020-10-20

## 2020-10-20 RX ORDER — FENTANYL CITRATE/PF 50 MCG/ML
25 SYRINGE (ML) INJECTION
Status: CANCELLED | OUTPATIENT
Start: 2020-10-20

## 2020-10-20 RX ORDER — LABETALOL 20 MG/4 ML (5 MG/ML) INTRAVENOUS SYRINGE
5
Status: CANCELLED | OUTPATIENT
Start: 2020-10-20

## 2020-10-20 RX ORDER — ONDANSETRON 2 MG/ML
4 INJECTION INTRAMUSCULAR; INTRAVENOUS ONCE AS NEEDED
Status: CANCELLED | OUTPATIENT
Start: 2020-10-20

## 2020-10-20 RX ORDER — MEPERIDINE HYDROCHLORIDE 50 MG/ML
12.5 INJECTION INTRAMUSCULAR; INTRAVENOUS; SUBCUTANEOUS ONCE
Status: CANCELLED | OUTPATIENT
Start: 2020-10-20 | End: 2020-10-20

## 2020-10-20 RX ORDER — ALBUTEROL SULFATE 2.5 MG/3ML
2.5 SOLUTION RESPIRATORY (INHALATION) ONCE AS NEEDED
Status: CANCELLED | OUTPATIENT
Start: 2020-10-20

## 2020-10-21 ENCOUNTER — ANESTHESIA (OUTPATIENT)
Dept: PERIOP | Facility: HOSPITAL | Age: 20
End: 2020-10-21
Payer: COMMERCIAL

## 2020-10-21 ENCOUNTER — HOSPITAL ENCOUNTER (OUTPATIENT)
Facility: HOSPITAL | Age: 20
Setting detail: OUTPATIENT SURGERY
Discharge: HOME | End: 2020-10-21
Attending: ORTHOPAEDIC SURGERY | Admitting: ORTHOPAEDIC SURGERY
Payer: COMMERCIAL

## 2020-10-21 ENCOUNTER — APPOINTMENT (OUTPATIENT)
Dept: RADIOLOGY | Facility: HOSPITAL | Age: 20
End: 2020-10-21
Payer: COMMERCIAL

## 2020-10-21 VITALS
HEART RATE: 80 BPM | TEMPERATURE: 97.1 F | HEIGHT: 70 IN | DIASTOLIC BLOOD PRESSURE: 62 MMHG | OXYGEN SATURATION: 99 % | SYSTOLIC BLOOD PRESSURE: 124 MMHG | BODY MASS INDEX: 22.25 KG/M2 | WEIGHT: 155.42 LBS | RESPIRATION RATE: 20 BRPM

## 2020-10-21 VITALS — HEART RATE: 79 BPM

## 2020-10-21 PROCEDURE — 73120 X-RAY EXAM OF HAND: CPT

## 2020-10-21 PROCEDURE — 20680 REMOVAL OF IMPLANT DEEP: CPT | Performed by: ORTHOPAEDIC SURGERY

## 2020-10-21 RX ORDER — CEFAZOLIN SODIUM 2 G/50ML
2000 SOLUTION INTRAVENOUS ONCE
Status: COMPLETED | OUTPATIENT
Start: 2020-10-21 | End: 2020-10-21

## 2020-10-21 RX ORDER — FENTANYL CITRATE 50 UG/ML
INJECTION, SOLUTION INTRAMUSCULAR; INTRAVENOUS AS NEEDED
Status: DISCONTINUED | OUTPATIENT
Start: 2020-10-21 | End: 2020-10-21

## 2020-10-21 RX ORDER — PROPOFOL 10 MG/ML
INJECTION, EMULSION INTRAVENOUS AS NEEDED
Status: DISCONTINUED | OUTPATIENT
Start: 2020-10-21 | End: 2020-10-21

## 2020-10-21 RX ORDER — MAGNESIUM HYDROXIDE 1200 MG/15ML
LIQUID ORAL AS NEEDED
Status: DISCONTINUED | OUTPATIENT
Start: 2020-10-21 | End: 2020-10-21 | Stop reason: HOSPADM

## 2020-10-21 RX ORDER — TRAMADOL HYDROCHLORIDE 50 MG/1
50 TABLET ORAL ONCE
Status: DISCONTINUED | OUTPATIENT
Start: 2020-10-21 | End: 2020-10-21 | Stop reason: HOSPADM

## 2020-10-21 RX ORDER — PROPOFOL 10 MG/ML
INJECTION, EMULSION INTRAVENOUS CONTINUOUS PRN
Status: DISCONTINUED | OUTPATIENT
Start: 2020-10-21 | End: 2020-10-21

## 2020-10-21 RX ORDER — LIDOCAINE HYDROCHLORIDE 10 MG/ML
0.5 INJECTION, SOLUTION EPIDURAL; INFILTRATION; INTRACAUDAL; PERINEURAL ONCE AS NEEDED
Status: DISCONTINUED | OUTPATIENT
Start: 2020-10-21 | End: 2020-10-21 | Stop reason: HOSPADM

## 2020-10-21 RX ORDER — ACETAMINOPHEN 325 MG/1
650 TABLET ORAL ONCE
Status: CANCELLED | OUTPATIENT
Start: 2020-10-21

## 2020-10-21 RX ORDER — MIDAZOLAM HYDROCHLORIDE 2 MG/2ML
INJECTION, SOLUTION INTRAMUSCULAR; INTRAVENOUS AS NEEDED
Status: DISCONTINUED | OUTPATIENT
Start: 2020-10-21 | End: 2020-10-21

## 2020-10-21 RX ORDER — LIDOCAINE HYDROCHLORIDE 10 MG/ML
INJECTION, SOLUTION EPIDURAL; INFILTRATION; INTRACAUDAL; PERINEURAL AS NEEDED
Status: DISCONTINUED | OUTPATIENT
Start: 2020-10-21 | End: 2020-10-21

## 2020-10-21 RX ORDER — SODIUM CHLORIDE, SODIUM LACTATE, POTASSIUM CHLORIDE, CALCIUM CHLORIDE 600; 310; 30; 20 MG/100ML; MG/100ML; MG/100ML; MG/100ML
125 INJECTION, SOLUTION INTRAVENOUS CONTINUOUS
Status: DISCONTINUED | OUTPATIENT
Start: 2020-10-21 | End: 2020-10-21 | Stop reason: HOSPADM

## 2020-10-21 RX ADMIN — MIDAZOLAM HYDROCHLORIDE 2 MG: 1 INJECTION, SOLUTION INTRAMUSCULAR; INTRAVENOUS at 11:37

## 2020-10-21 RX ADMIN — FENTANYL CITRATE 50 MCG: 50 INJECTION, SOLUTION INTRAMUSCULAR; INTRAVENOUS at 11:42

## 2020-10-21 RX ADMIN — CEFAZOLIN SODIUM 2000 MG: 2 SOLUTION INTRAVENOUS at 11:11

## 2020-10-21 RX ADMIN — PROPOFOL 100 MCG/KG/MIN: 10 INJECTION, EMULSION INTRAVENOUS at 11:43

## 2020-10-21 RX ADMIN — SODIUM CHLORIDE, SODIUM LACTATE, POTASSIUM CHLORIDE, AND CALCIUM CHLORIDE 125 ML/HR: .6; .31; .03; .02 INJECTION, SOLUTION INTRAVENOUS at 09:53

## 2020-10-21 RX ADMIN — LIDOCAINE HYDROCHLORIDE 50 MG: 10 INJECTION, SOLUTION EPIDURAL; INFILTRATION; INTRACAUDAL; PERINEURAL at 11:42

## 2020-10-21 RX ADMIN — PROPOFOL 150 MG: 10 INJECTION, EMULSION INTRAVENOUS at 11:43

## 2020-10-23 ENCOUNTER — EVALUATION (OUTPATIENT)
Dept: OCCUPATIONAL THERAPY | Facility: CLINIC | Age: 20
End: 2020-10-23
Payer: COMMERCIAL

## 2020-10-23 DIAGNOSIS — Z87.81 S/P ORIF (OPEN REDUCTION INTERNAL FIXATION) FRACTURE: ICD-10-CM

## 2020-10-23 DIAGNOSIS — Z98.890 S/P ORIF (OPEN REDUCTION INTERNAL FIXATION) FRACTURE: ICD-10-CM

## 2020-10-23 DIAGNOSIS — S62.323D CLOSED DISPLACED FRACTURE OF SHAFT OF THIRD METACARPAL BONE OF LEFT HAND WITH ROUTINE HEALING, SUBSEQUENT ENCOUNTER: Primary | ICD-10-CM

## 2020-10-23 PROCEDURE — 97165 OT EVAL LOW COMPLEX 30 MIN: CPT

## 2020-10-27 ENCOUNTER — OFFICE VISIT (OUTPATIENT)
Dept: OCCUPATIONAL THERAPY | Facility: CLINIC | Age: 20
End: 2020-10-27
Payer: COMMERCIAL

## 2020-10-27 DIAGNOSIS — S62.323D CLOSED DISPLACED FRACTURE OF SHAFT OF THIRD METACARPAL BONE OF LEFT HAND WITH ROUTINE HEALING, SUBSEQUENT ENCOUNTER: ICD-10-CM

## 2020-10-27 DIAGNOSIS — Z98.890 S/P ORIF (OPEN REDUCTION INTERNAL FIXATION) FRACTURE: Primary | ICD-10-CM

## 2020-10-27 DIAGNOSIS — Z87.81 S/P ORIF (OPEN REDUCTION INTERNAL FIXATION) FRACTURE: Primary | ICD-10-CM

## 2020-10-27 PROCEDURE — 97110 THERAPEUTIC EXERCISES: CPT

## 2020-10-30 ENCOUNTER — OFFICE VISIT (OUTPATIENT)
Dept: OCCUPATIONAL THERAPY | Facility: CLINIC | Age: 20
End: 2020-10-30
Payer: COMMERCIAL

## 2020-10-30 DIAGNOSIS — Z98.890 S/P ORIF (OPEN REDUCTION INTERNAL FIXATION) FRACTURE: Primary | ICD-10-CM

## 2020-10-30 DIAGNOSIS — S62.323D CLOSED DISPLACED FRACTURE OF SHAFT OF THIRD METACARPAL BONE OF LEFT HAND WITH ROUTINE HEALING, SUBSEQUENT ENCOUNTER: ICD-10-CM

## 2020-10-30 DIAGNOSIS — Z87.81 S/P ORIF (OPEN REDUCTION INTERNAL FIXATION) FRACTURE: Primary | ICD-10-CM

## 2020-10-30 PROCEDURE — 97110 THERAPEUTIC EXERCISES: CPT

## 2020-11-02 ENCOUNTER — OFFICE VISIT (OUTPATIENT)
Dept: OCCUPATIONAL THERAPY | Facility: CLINIC | Age: 20
End: 2020-11-02
Payer: COMMERCIAL

## 2020-11-02 DIAGNOSIS — Z87.81 S/P ORIF (OPEN REDUCTION INTERNAL FIXATION) FRACTURE: Primary | ICD-10-CM

## 2020-11-02 DIAGNOSIS — Z98.890 S/P ORIF (OPEN REDUCTION INTERNAL FIXATION) FRACTURE: Primary | ICD-10-CM

## 2020-11-02 DIAGNOSIS — S62.323D CLOSED DISPLACED FRACTURE OF SHAFT OF THIRD METACARPAL BONE OF LEFT HAND WITH ROUTINE HEALING, SUBSEQUENT ENCOUNTER: ICD-10-CM

## 2020-11-02 PROCEDURE — 97110 THERAPEUTIC EXERCISES: CPT

## 2020-11-02 PROCEDURE — 97140 MANUAL THERAPY 1/> REGIONS: CPT

## 2020-11-03 ENCOUNTER — OFFICE VISIT (OUTPATIENT)
Dept: OBGYN CLINIC | Facility: CLINIC | Age: 20
End: 2020-11-03

## 2020-11-03 ENCOUNTER — APPOINTMENT (OUTPATIENT)
Dept: RADIOLOGY | Facility: CLINIC | Age: 20
End: 2020-11-03
Payer: COMMERCIAL

## 2020-11-03 VITALS
TEMPERATURE: 97.8 F | HEART RATE: 61 BPM | BODY MASS INDEX: 22.19 KG/M2 | WEIGHT: 155 LBS | DIASTOLIC BLOOD PRESSURE: 73 MMHG | SYSTOLIC BLOOD PRESSURE: 113 MMHG | HEIGHT: 70 IN

## 2020-11-03 DIAGNOSIS — Z48.89 AFTERCARE FOLLOWING SURGERY: ICD-10-CM

## 2020-11-03 DIAGNOSIS — Z48.89 AFTERCARE FOLLOWING SURGERY: Primary | ICD-10-CM

## 2020-11-03 PROCEDURE — 99024 POSTOP FOLLOW-UP VISIT: CPT | Performed by: ORTHOPAEDIC SURGERY

## 2020-11-03 PROCEDURE — 73130 X-RAY EXAM OF HAND: CPT

## 2020-11-05 ENCOUNTER — OFFICE VISIT (OUTPATIENT)
Dept: OCCUPATIONAL THERAPY | Facility: CLINIC | Age: 20
End: 2020-11-05
Payer: COMMERCIAL

## 2020-11-05 DIAGNOSIS — Z87.81 S/P ORIF (OPEN REDUCTION INTERNAL FIXATION) FRACTURE: Primary | ICD-10-CM

## 2020-11-05 DIAGNOSIS — S62.323D CLOSED DISPLACED FRACTURE OF SHAFT OF THIRD METACARPAL BONE OF LEFT HAND WITH ROUTINE HEALING, SUBSEQUENT ENCOUNTER: ICD-10-CM

## 2020-11-05 DIAGNOSIS — Z98.890 S/P ORIF (OPEN REDUCTION INTERNAL FIXATION) FRACTURE: Primary | ICD-10-CM

## 2020-11-05 PROCEDURE — 97140 MANUAL THERAPY 1/> REGIONS: CPT

## 2020-11-05 PROCEDURE — 97110 THERAPEUTIC EXERCISES: CPT

## 2020-11-05 PROCEDURE — 97530 THERAPEUTIC ACTIVITIES: CPT

## 2020-11-06 ENCOUNTER — NURSE TRIAGE (OUTPATIENT)
Dept: OTHER | Facility: OTHER | Age: 20
End: 2020-11-06

## 2020-11-06 DIAGNOSIS — Z91.89 AT INCREASED RISK OF EXPOSURE TO COVID-19 VIRUS: Primary | ICD-10-CM

## 2020-11-09 ENCOUNTER — OFFICE VISIT (OUTPATIENT)
Dept: OCCUPATIONAL THERAPY | Facility: CLINIC | Age: 20
End: 2020-11-09
Payer: COMMERCIAL

## 2020-11-09 DIAGNOSIS — Z87.81 S/P ORIF (OPEN REDUCTION INTERNAL FIXATION) FRACTURE: Primary | ICD-10-CM

## 2020-11-09 DIAGNOSIS — Z98.890 S/P ORIF (OPEN REDUCTION INTERNAL FIXATION) FRACTURE: Primary | ICD-10-CM

## 2020-11-09 DIAGNOSIS — Z91.89 AT INCREASED RISK OF EXPOSURE TO COVID-19 VIRUS: ICD-10-CM

## 2020-11-09 DIAGNOSIS — S62.323D CLOSED DISPLACED FRACTURE OF SHAFT OF THIRD METACARPAL BONE OF LEFT HAND WITH ROUTINE HEALING, SUBSEQUENT ENCOUNTER: ICD-10-CM

## 2020-11-09 PROCEDURE — 97110 THERAPEUTIC EXERCISES: CPT

## 2020-11-09 PROCEDURE — 97140 MANUAL THERAPY 1/> REGIONS: CPT

## 2020-11-09 PROCEDURE — U0003 INFECTIOUS AGENT DETECTION BY NUCLEIC ACID (DNA OR RNA); SEVERE ACUTE RESPIRATORY SYNDROME CORONAVIRUS 2 (SARS-COV-2) (CORONAVIRUS DISEASE [COVID-19]), AMPLIFIED PROBE TECHNIQUE, MAKING USE OF HIGH THROUGHPUT TECHNOLOGIES AS DESCRIBED BY CMS-2020-01-R: HCPCS | Performed by: FAMILY MEDICINE

## 2020-11-11 LAB — SARS-COV-2 RNA SPEC QL NAA+PROBE: NOT DETECTED

## 2021-05-19 ENCOUNTER — OFFICE VISIT (OUTPATIENT)
Dept: URGENT CARE | Facility: CLINIC | Age: 21
End: 2021-05-19
Payer: COMMERCIAL

## 2021-05-19 VITALS
DIASTOLIC BLOOD PRESSURE: 72 MMHG | OXYGEN SATURATION: 99 % | RESPIRATION RATE: 16 BRPM | BODY MASS INDEX: 23.19 KG/M2 | HEART RATE: 78 BPM | SYSTOLIC BLOOD PRESSURE: 114 MMHG | HEIGHT: 70 IN | WEIGHT: 162 LBS | TEMPERATURE: 99 F
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
## 2021-05-19 PROCEDURE — 99213 OFFICE O/P EST LOW 20 MIN: CPT | Performed by: PHYSICIAN ASSISTANT

## 2021-05-19 RX ORDER — DOXYCYCLINE 100 MG/1
200 TABLET ORAL DAILY
Qty: 2 TABLET | Refills: 0 | Status: SHIPPED | OUTPATIENT
Start: 2021-05-19 | End: 2021-05-20

## 2021-05-19 NOTE — PATIENT INSTRUCTIONS
Doxycycline for prophylaxis  Continue to monitor rash in for signs and symptoms as discussed  PCP follow-up  Return to clinic with new or worsening symptoms  Tick Bite   WHAT YOU NEED TO KNOW:   Most tick bites are not dangerous, but ticks can pass disease or infection when they bite  Ticks need to be removed quickly  You may have redness, pain, itching, and swelling near the bite  Blisters may also develop  DISCHARGE INSTRUCTIONS:   Return to the emergency department if:   · You have trouble walking or moving your legs  · You have joint pain, muscle pain, or muscle weakness within 1 month of a tick bite  · You have a fever, chills, headache, or rash  Contact your healthcare provider if:   · You cannot remove the tick  · The tick's head is stuck in your skin  · You have questions or concerns about your condition or care  Medicines:   · Medicines  help decrease pain, redness, itching, and swelling  You may also need medicine to prevent or fight a bacterial infection  These medicines may be given as a cream, lotion, or pill  · Take your medicine as directed  Contact your healthcare provider if you think your medicine is not helping or if you have side effects  Tell him of her if you are allergic to any medicine  Keep a list of the medicines, vitamins, and herbs you take  Include the amounts, and when and why you take them  Bring the list or the pill bottles to follow-up visits  Carry your medicine list with you in case of an emergency  How to remove a tick:  Remove the tick as soon as possible to help prevent disease or infection  You are less likely to get sick from a tick bite if you remove the tick within 24 hours  Do not use petroleum jelly, nail polish, rubbing alcohol, or heat  These do not work and may be dangerous  Do the following to remove a tick:  · First, try a soapy cotton ball  Soak a cotton ball in liquid soap  Cover the tick with the cotton ball for 30 seconds   The tick may come off with the cotton ball when you pull it away  · Use tweezers if the soapy cotton ball does not work  Grasp the tick as close to your skin as possible  Pull the tick straight up and out  Do not touch the tick with your bare hands  · Do not twist or jerk the tick suddenly, because this may break off the tick's head or mouth parts  Do not leave any part of the tick in your skin  · Do not crush or squeeze the tick since its body may be infected with germs  Flush the tick down the toilet  · After the tick is removed, clean the area of the bite with rubbing alcohol  Then wash your hands with soap and water  Apply ice  on your bite for 15 to 20 minutes every hour or as directed  Use an ice pack, or put crushed ice in a plastic bag  Cover it with a towel before you apply it to your skin  Ice helps prevent tissue damage and decreases swelling and pain  Prevent a tick bite:  Ticks live in areas covered by brush and grass  They may even be found in your lawn if you live in certain areas  Outdoor pets can carry ticks inside the house  Ticks can grab onto you or your clothes when you walk by grass or brush  If you go into areas that contain many trees, tall grasses, and underbrush, do the following:  · Wear light colored pants and a long-sleeved shirt  Tuck your pants into your socks or boots  Tuck in your shirt  Wear sleeves that fit close to the skin at your wrists and neck  This will help prevent ticks from crawling through gaps in your clothing and onto your skin  Wear a hat in areas with trees  · Apply insect repellant on your skin  The insect repellant should contain DEET  Do not put insect repellant on skin that is cut, scratched, or irritated  Always use soap and water to wash the insect repellant off as soon as possible once you are indoors  Do not apply insect repellant on your child's face or hands  · Spray insect repellant onto your clothes  Use permethrin spray   This spray kills ticks that crawl on your clothing  Be sure to spray the tops of your boots, bottom of pant legs, and sleeve cuffs  As soon as possible, wash and dry clothing in hot water and high heat  · Check your clothing, hair, and skin for ticks  Shower within 2 hours of coming indoors  Carefully check the hairline, armpits, neck, and waist  Check your pets and children for ticks  Remove ticks from pets the same way as you remove them from people  · Decrease the risk for ticks in your yard  Ticks like to live in shady, moist areas  Virgin Huh your lawn regularly to keep the grass short  Trim the grass around birdbaths and fences  Cut branches that are overgrown and take them out of the yard  Clear out leaf piles  Mathew Orris firewood in a dry, cristofer area  Follow up with your healthcare provider as directed:  Write down your questions so you remember to ask them during your visits  © Copyright [address] Information is for End User's use only and may not be sold, redistributed or otherwise used for commercial purposes  All illustrations and images included in CareNotes® are the copyrighted property of [employer]  or [facility] Sara Mcelroy   The above information is an  only  It is not intended as medical advice for individual conditions or treatments  Talk to your doctor, nurse or pharmacist before following any medical regimen to see if it is safe and effective for you

## 2021-05-19 NOTE — PROGRESS NOTES
[employer] [facility]        NAME: Arlon Spatz is a 23 y o  male  : 2000    MRN: [id number]  DATE: May 19, 2021  TIME: 7:56 PM    Assessment and Plan   Tick bite, initial encounter [W57  XXXA]  1  Tick bite, initial encounter  doxycycline (ADOXA) 100 MG tablet         Patient Instructions     Patient Instructions     Doxycycline for prophylaxis  Continue to monitor rash in for signs and symptoms as discussed  PCP follow-up  Return to clinic with new or worsening symptoms  Tick Bite   WHAT YOU NEED TO KNOW:   Most tick bites are not dangerous, but ticks can pass disease or infection when they bite  Ticks need to be removed quickly  You may have redness, pain, itching, and swelling near the bite  Blisters may also develop  DISCHARGE INSTRUCTIONS:   Return to the emergency department if:   · You have trouble walking or moving your legs  · You have joint pain, muscle pain, or muscle weakness within 1 month of a tick bite  · You have a fever, chills, headache, or rash  Contact your healthcare provider if:   · You cannot remove the tick  · The tick's head is stuck in your skin  · You have questions or concerns about your condition or care  Medicines:   · Medicines  help decrease pain, redness, itching, and swelling  You may also need medicine to prevent or fight a bacterial infection  These medicines may be given as a cream, lotion, or pill  · Take your medicine as directed  Contact your healthcare provider if you think your medicine is not helping or if you have side effects  Tell him of her if you are allergic to any medicine  Keep a list of the medicines, vitamins, and herbs you take  Include the amounts, and when and why you take them  Bring the list or the pill bottles to follow-up visits  Carry your medicine list with you in case of an emergency  How to remove a tick:  Remove the tick as soon as possible to help prevent disease or infection   You are less likely to get sick from a tick bite if you remove the tick within 24 hours  Do not use petroleum jelly, nail polish, rubbing alcohol, or heat  These do not work and may be dangerous  Do the following to remove a tick:  · First, try a soapy cotton ball  Soak a cotton ball in liquid soap  Cover the tick with the cotton ball for 30 seconds  The tick may come off with the cotton ball when you pull it away  · Use tweezers if the soapy cotton ball does not work  Grasp the tick as close to your skin as possible  Pull the tick straight up and out  Do not touch the tick with your bare hands  · Do not twist or jerk the tick suddenly, because this may break off the tick's head or mouth parts  Do not leave any part of the tick in your skin  · Do not crush or squeeze the tick since its body may be infected with germs  Flush the tick down the toilet  · After the tick is removed, clean the area of the bite with rubbing alcohol  Then wash your hands with soap and water  Apply ice  on your bite for 15 to 20 minutes every hour or as directed  Use an ice pack, or put crushed ice in a plastic bag  Cover it with a towel before you apply it to your skin  Ice helps prevent tissue damage and decreases swelling and pain  Prevent a tick bite:  Ticks live in areas covered by brush and grass  They may even be found in your lawn if you live in certain areas  Outdoor pets can carry ticks inside the house  Ticks can grab onto you or your clothes when you walk by grass or brush  If you go into areas that contain many trees, tall grasses, and underbrush, do the following:  · Wear light colored pants and a long-sleeved shirt  Tuck your pants into your socks or boots  Tuck in your shirt  Wear sleeves that fit close to the skin at your wrists and neck  This will help prevent ticks from crawling through gaps in your clothing and onto your skin  Wear a hat in areas with trees  · Apply insect repellant on your skin    The insect repellant should contain DEET  Do not put insect repellant on skin that is cut, scratched, or irritated  Always use soap and water to wash the insect repellant off as soon as possible once you are indoors  Do not apply insect repellant on your child's face or hands  · Spray insect repellant onto your clothes  Use permethrin spray  This spray kills ticks that crawl on your clothing  Be sure to spray the tops of your boots, bottom of pant legs, and sleeve cuffs  As soon as possible, wash and dry clothing in hot water and high heat  · Check your clothing, hair, and skin for ticks  Shower within 2 hours of coming indoors  Carefully check the hairline, armpits, neck, and waist  Check your pets and children for ticks  Remove ticks from pets the same way as you remove them from people  · Decrease the risk for ticks in your yard  Ticks like to live in shady, moist areas  [place] your lawn regularly to keep the grass short  Trim the grass around birdbaths and fences  Cut branches that are overgrown and take them out of the yard  Clear out leaf piles  [place] firewood in a dry, cristofer area  Follow up with your healthcare provider as directed:  Write down your questions so you remember to ask them during your visits  © Copyright [address] Information is for End User's use only and may not be sold, redistributed or otherwise used for commercial purposes  All illustrations and images included in CareNotes® are the copyrighted property of A D A M , Inc  or [address] [place] [address]  The above information is an  only  It is not intended as medical advice for individual conditions or treatments  Talk to your doctor, nurse or pharmacist before following any medical regimen to see if it is safe and effective for you  **Portions of the record may have been created with voice recognition software    Occasional wrong word or "sound a like" substitutions may have occurred due to the inherent limitations of voice recognition software  Read the chart carefully and recognize, using context, where substitutions have occurred  **     Chief Complaint     Chief Complaint   Patient presents with    Tick Removal     pt removed tick from L upper thigh last night  Thinks it could've been on him for 2 days  States site is purple         History of Present Illness       79-year-old male presents clinic complaints of tick bite on left thigh x2 days  Patient remove the tick in full noticed a red rash and bruise around the area  Denies any fever, chills, numbness or tingling, muscle aches, joint pains  He states that he did not think the tick was engorged  Review of Systems     Review of Systems   Constitutional: Negative for chills, fatigue and fever  HENT: Negative  Respiratory: Negative for shortness of breath  Cardiovascular: Negative for chest pain  Gastrointestinal: Negative for diarrhea, nausea and vomiting  Musculoskeletal: Negative for arthralgias and myalgias  Skin: Positive for rash  Negative for wound  Neurological: Negative for weakness, numbness and headaches           Current Medications     Current Outpatient Medications:     acetaminophen (TYLENOL) 500 mg tablet, Take one tablet the day of surgery, then take one tablet for breakfast lunch and dinner after surgery for 5 days (Patient not taking: Reported on 5/19/2021), Disp: 30 tablet, Rfl: 0    doxycycline (ADOXA) 100 MG tablet, Take 2 tablets (200 mg total) by mouth daily for 1 day, Disp: 2 tablet, Rfl: 0    ibuprofen (MOTRIN) 600 mg tablet, Take one tablet the day of surgery, then take one tablet for breakfast lunch and dinner after surgery for 5 days (Patient not taking: Reported on 5/19/2021), Disp: 30 tablet, Rfl: 0    Current Allergies     Allergies as of 05/19/2021    (No Known Allergies)            The following portions of the patient's history were reviewed and updated as appropriate: allergies, current medications, past family history, past medical history, past social history, past surgical history and problem list      Past Medical History:   Diagnosis Date    Seasonal allergies        Past Surgical History:   Procedure Laterality Date    NE OPEN TX METACARPAL FRACTURE SINGLE EA BONE Left 8/5/2020    Procedure: OPEN REDUCTION W/ INTERNAL FIXATION (ORIF) left hand 3rd metacarpal;  Surgeon: Lashay Winn MD;  Location: [facility];  Service: Orthopedics    NE REMOVAL DEEP IMPLANT Left 10/21/2020    Procedure: REMOVAL HARDWARE left 3rd metacarpal;  Surgeon: Lashay Winn MD;  Location: [facility];  Service: Orthopedics       History reviewed  No pertinent family history  Medications have been verified  Objective     /72   Pulse 78   Temp 99 °F (37 2 °C) (Temporal)   Resp 16   Ht 5' 10" (1 778 m)   Wt 73 5 kg (162 lb)   SpO2 99%   BMI 23 24 kg/m²        Physical Exam     Physical Exam  Vitals signs and nursing note reviewed  Constitutional:       General: He is not in acute distress  Appearance: Normal appearance  HENT:      Head: Normocephalic and atraumatic  Cardiovascular:      Rate and Rhythm: Normal rate  Pulmonary:      Effort: Pulmonary effort is normal    Skin:     General: Skin is warm and dry  Capillary Refill: Capillary refill takes less than 2 seconds  Findings: Rash (Erythematous and ecchymotic lesion of left upper inner thigh with central clearing around a puncture  No wound opening or discharge, induration or fluctuance, streaking erythema, heat or tenderness palpation ) present  Neurological:      Mental Status: He is alert  Sensory: No sensory deficit

## 2022-04-01 ENCOUNTER — OFFICE VISIT (OUTPATIENT)
Dept: URGENT CARE | Facility: CLINIC | Age: 22
End: 2022-04-01
Payer: COMMERCIAL

## 2022-04-01 VITALS
HEIGHT: 70 IN | TEMPERATURE: 99.2 F | BODY MASS INDEX: 23.19 KG/M2 | OXYGEN SATURATION: 99 % | HEART RATE: 98 BPM | WEIGHT: 162 LBS | RESPIRATION RATE: 18 BRPM | SYSTOLIC BLOOD PRESSURE: 142 MMHG | DIASTOLIC BLOOD PRESSURE: 64 MMHG

## 2022-04-01 DIAGNOSIS — R07.9 CHEST PAIN, UNSPECIFIED TYPE: Primary | ICD-10-CM

## 2022-04-01 LAB
ATRIAL RATE: 92 BPM
P AXIS: 73 DEGREES
PR INTERVAL: 130 MS
QRS AXIS: 76 DEGREES
QRSD INTERVAL: 98 MS
QT INTERVAL: 350 MS
QTC INTERVAL: 432 MS
T WAVE AXIS: 67 DEGREES
VENTRICULAR RATE: 92 BPM

## 2022-04-01 PROCEDURE — 93005 ELECTROCARDIOGRAM TRACING: CPT

## 2022-04-01 PROCEDURE — 99213 OFFICE O/P EST LOW 20 MIN: CPT

## 2022-04-01 PROCEDURE — 93010 ELECTROCARDIOGRAM REPORT: CPT | Performed by: INTERNAL MEDICINE

## 2022-04-01 RX ORDER — CIMETIDINE 800 MG
TABLET ORAL
COMMUNITY
Start: 2022-03-24

## 2022-04-01 NOTE — PROGRESS NOTES
[facility] [employer] [facility]        NAME: Nathaniel Blum is a 24 y o  male  : 2000    MRN: [id number]  DATE: 2022  TIME: 10:34 AM    Assessment and Plan   Chest pain, unspecified type [R07 9]  1  Chest pain, unspecified type       EKG shows NSR rate 92  No ST elevation or T-wave changes  Discussed with patient symptoms do not appear cardiac in nature  He has reproducible muscular tenderness near axilla area  H/o untreated GERD as well  He reports symptoms improved since yesterday, minimal pain currently  Advised close monitoring of symptoms at home with instructions on when to report to ER  Patient verbalized understanding  Recommend follow up with PCP  Patient Instructions     Patient Instructions     Monitor your symptoms  if you develop any new or worsening symptoms such as increased pain, squeezing pressure in your chest, back, neck or jaw pain, shortness of breath, dizziness, sweating, nausea or vomiting, proceed to ER for further evaluation  Follow up with your PCP  Chest Pain   AMBULATORY CARE:   Chest pain  can be caused by a range of conditions, from not serious to life-threatening  It may be caused by a heart attack or a blood clot in your lungs  Sometimes chest pain or pressure is caused by poor blood flow to your heart (angina)  Infection, inflammation, or a fracture in the bones or cartilage in your chest can cause pain or discomfort  Chest pain can also be a symptom of a digestive problem, such as acid reflux or a stomach ulcer  An anxiety attack or a strong emotion such as anger can also cause chest pain  It is important to follow up with your healthcare provider to find the cause of your chest pain    Common symptoms you may have with chest pain:   · Fever or sweating    · Nausea or vomiting    · Shortness of breath    · Discomfort or pressure that spreads from your chest to your back, jaw, or arm    · A racing or slow heartbeat    · Feeling weak, tired, or faint    Call your local emergency number (911 in the [address]) or have someone call if:   · You have any of the following signs of a heart attack:      ? Squeezing, pressure, or pain in your chest    ? You may  also have any of the following:     § Discomfort or pain in your back, neck, jaw, stomach, or arm    § Shortness of breath    § Nausea or vomiting    § Lightheadedness or a sudden cold sweat      Seek care immediately if:   · You have chest discomfort that gets worse, even with medicine  · You cough or vomit blood  · Your bowel movements are black or bloody  · You cannot stop vomiting, or it hurts to swallow  Call your doctor if:   · You have questions or concerns about your condition or care  Treatment for chest pain  may include medicine to treat your symptoms while your healthcare provider finds the cause of your chest pain  · Medicines  may be given to treat the cause of your chest pain  Examples include pain medicine, anxiety medicine, or medicines to increase blood flow to your heart  · Do not take certain medicines without asking your healthcare provider first   These include NSAIDs, herbal or vitamin supplements, or hormones (estrogen or progestin)  · One or more stents  may need to be placed in your heart if pain was caused by blockage  A stent is a wire mesh tube that helps hold your artery open  Healthy living tips: The following are general healthy guidelines  If the cause of your chest pain is known, your healthcare provider will give you specific guidelines to follow  · Do not smoke  Nicotine and other chemicals in cigarettes and cigars can cause lung and heart damage  Ask your healthcare provider for information if you currently smoke and need help to quit  E-cigarettes or smokeless tobacco still contain nicotine  Talk to your healthcare provider before you use these products  · Choose a variety of healthy foods as often as possible    Include fresh, frozen, or canned fruits and vegetables  Also include low-fat dairy products, fish, chicken (without skin), and lean meats  Your healthcare provider or a dietitian can help you create meal plans  You may need to avoid certain foods or drinks if your pain is caused by a digestion problem  · Lower your sodium (salt) intake  Limit foods that are high in sodium, such as canned foods, salty snacks, and cold cuts  If you add salt when you cook food, do not add more at the table  Choose low-sodium canned foods as much as possible  · Drink plenty of water every day  Water helps your body to control your temperature and blood pressure  Ask your healthcare provider how much water you should drink every day  · Ask about activity  Your healthcare provider will tell you which activities to limit or avoid  Ask when you can drive, return to work, and have sex  Ask about the best exercise plan for you  · Maintain a healthy weight  Ask your healthcare provider what a healthy weight is for you  Ask him or her to help you create a safe weight loss plan if you are overweight  · Ask about vaccines you may need  Get the influenza (flu) vaccine every year as soon as recommended, usually in September or October  You may also need a pneumococcal vaccine to prevent pneumonia  The vaccine is usually given every 5 years, starting at age 72  Your healthcare provider can tell you if should get other vaccines, and when to get them  Follow up with your healthcare provider within 72 hours, or as directed: You may need to return for more tests to find the cause of your chest pain  You may be referred to a specialist, such as a cardiologist or gastroenterologist  Write down your questions so you remember to ask them during your visits  © Copyright [employer] 2022 Information is for End User's use only and may not be sold, redistributed or otherwise used for commercial purposes   All illustrations and images included in CareNotes® are the copyrighted property of [employer]  or [address]   The above information is an  only  It is not intended as medical advice for individual conditions or treatments  Talk to your doctor, nurse or pharmacist before following any medical regimen to see if it is safe and effective for you  Follow up with PCP in 3-5 days  Proceed to  ER if symptoms worsen  Chief Complaint     Chief Complaint   Patient presents with    Chest Pain     started last night  was sitting at table when the pain started  has been intermittent sice  describes it as a deep burning  radiates down L arm  History of Present Illness     Nolvia Samaniego is a 24 y o  male who presents today for evaluation of chest pain that started last night before dinner  He reports the pain is intermittent and has improved since last night  The pain radiates down his left arm  The pain is worse with lifting his left arm  Denies any other associated symptoms  He reports a recent URI last week which he reports has resolved  He reports new GERD symptoms over the past 2 months  Has not taken any GERD medications  Denies cardiac history  He took aspirin last night for his pain which improved his symptoms  Chest Pain   This is a new problem  The current episode started yesterday  The onset quality is sudden  The problem occurs intermittently  The problem has been gradually improving  The pain is present in the lateral region and substernal region  The pain is at a severity of 1/10  The quality of the pain is described as burning  The pain radiates to the left arm  Pertinent negatives include no abdominal pain, back pain, cough, dizziness, fever, headaches, numbness, palpitations, shortness of breath, vomiting or weakness  The pain is aggravated by movement  He has tried NSAIDs for the symptoms  The treatment provided mild relief  Risk factors include alcohol intake     His family medical history is significant for CAD and diabetes  Review of Systems   Review of Systems   Constitutional: Negative for fever  Respiratory: Negative for cough and shortness of breath  Cardiovascular: Positive for chest pain  Negative for palpitations  Gastrointestinal: Negative for abdominal pain and vomiting  Musculoskeletal: Negative for back pain  Neurological: Negative for dizziness, weakness, numbness and headaches  Current Medications       Current Outpatient Medications:     acetaminophen (TYLENOL) 500 mg tablet, Take one tablet the day of surgery, then take one tablet for breakfast lunch and dinner after surgery for 5 days (Patient not taking: Reported on 5/19/2021), Disp: 30 tablet, Rfl: 0    cimetidine (TAGAMET) 800 MG tablet, , Disp: , Rfl:     ibuprofen (MOTRIN) 600 mg tablet, Take one tablet the day of surgery, then take one tablet for breakfast lunch and dinner after surgery for 5 days (Patient not taking: Reported on 5/19/2021), Disp: 30 tablet, Rfl: 0    Current Allergies     Allergies as of 04/01/2022    (No Known Allergies)            The following portions of the patient's history were reviewed and updated as appropriate: allergies, current medications, past family history, past medical history, past social history, past surgical history and problem list      Past Medical History:   Diagnosis Date    Seasonal allergies        Past Surgical History:   Procedure Laterality Date    UT OPEN TX METACARPAL FRACTURE SINGLE EA BONE Left 8/5/2020    Procedure: OPEN REDUCTION W/ INTERNAL FIXATION (ORIF) left hand 3rd metacarpal;  Surgeon: Kel Del Castillo MD;  Location: [facility];  Service: Orthopedics    UT REMOVAL DEEP IMPLANT Left 10/21/2020    Procedure: REMOVAL HARDWARE left 3rd metacarpal;  Surgeon: Kel Del Castillo MD;  Location: [facility];  Service: Orthopedics       History reviewed  No pertinent family history  Medications have been verified          Objective   /64 (BP Location: Left arm, Patient Position: Sitting)   Pulse 98   Temp 99 2 °F (37 3 °C) (Temporal)   Resp 18   Ht 5' 10" (1 778 m)   Wt 73 5 kg (162 lb)   SpO2 99%   BMI 23 24 kg/m²        Physical Exam     Physical Exam  Vitals and nursing note reviewed  Constitutional:       General: He is not in acute distress  Appearance: Normal appearance  He is well-developed  Comments: Patient is sitting on exam table in NAD  HENT:      Head: Normocephalic and atraumatic  Right Ear: Tympanic membrane and ear canal normal       Left Ear: Tympanic membrane and ear canal normal       Mouth/Throat:      Mouth: Mucous membranes are moist       Pharynx: Uvula midline  Tonsils: No tonsillar exudate  Eyes:      Pupils: Pupils are equal, round, and reactive to light  Cardiovascular:      Rate and Rhythm: Normal rate and regular rhythm  Heart sounds: Normal heart sounds, S1 normal and S2 normal    Pulmonary:      Effort: Pulmonary effort is normal       Breath sounds: Normal breath sounds  No wheezing, rhonchi or rales  Chest:      Chest wall: Tenderness present  Abdominal:      General: Bowel sounds are normal       Palpations: Abdomen is soft  Tenderness: There is no abdominal tenderness  Musculoskeletal:      Cervical back: Full passive range of motion without pain  Right lower leg: No edema  Left lower leg: No edema  Lymphadenopathy:      Cervical: No cervical adenopathy  Skin:     General: Skin is warm and dry  Capillary Refill: Capillary refill takes less than 2 seconds  Neurological:      General: No focal deficit present  Mental Status: He is alert  Psychiatric:         Behavior: Behavior normal  Behavior is cooperative

## 2022-04-01 NOTE — PATIENT INSTRUCTIONS
Monitor your symptoms  if you develop any new or worsening symptoms such as increased pain, squeezing pressure in your chest, back, neck or jaw pain, shortness of breath, dizziness, sweating, nausea or vomiting, proceed to ER for further evaluation  Follow up with your PCP  Chest Pain   AMBULATORY CARE:   Chest pain  can be caused by a range of conditions, from not serious to life-threatening  It may be caused by a heart attack or a blood clot in your lungs  Sometimes chest pain or pressure is caused by poor blood flow to your heart (angina)  Infection, inflammation, or a fracture in the bones or cartilage in your chest can cause pain or discomfort  Chest pain can also be a symptom of a digestive problem, such as acid reflux or a stomach ulcer  An anxiety attack or a strong emotion such as anger can also cause chest pain  It is important to follow up with your healthcare provider to find the cause of your chest pain  Common symptoms you may have with chest pain:   · Fever or sweating    · Nausea or vomiting    · Shortness of breath    · Discomfort or pressure that spreads from your chest to your back, jaw, or arm    · A racing or slow heartbeat    · Feeling weak, tired, or faint    Call your local emergency number (911 in the [address] [place] [address],[address]) or have someone call if:   · You have any of the following signs of a heart attack:      ? Squeezing, pressure, or pain in your chest    ? You may  also have any of the following:     § Discomfort or pain in your back, neck, jaw, stomach, or arm    § Shortness of breath    § Nausea or vomiting    § Lightheadedness or a sudden cold sweat      Seek care immediately if:   · You have chest discomfort that gets worse, even with medicine  · You cough or vomit blood  · Your bowel movements are black or bloody  · You cannot stop vomiting, or it hurts to swallow  Call your doctor if:   · You have questions or concerns about your condition or care        Treatment for chest pain  may include medicine to treat your symptoms while your healthcare provider finds the cause of your chest pain  · Medicines  may be given to treat the cause of your chest pain  Examples include pain medicine, anxiety medicine, or medicines to increase blood flow to your heart  · Do not take certain medicines without asking your healthcare provider first   These include NSAIDs, herbal or vitamin supplements, or hormones (estrogen or progestin)  · One or more stents  may need to be placed in your heart if pain was caused by blockage  A stent is a wire mesh tube that helps hold your artery open  Healthy living tips: The following are general healthy guidelines  If the cause of your chest pain is known, your healthcare provider will give you specific guidelines to follow  · Do not smoke  Nicotine and other chemicals in cigarettes and cigars can cause lung and heart damage  Ask your healthcare provider for information if you currently smoke and need help to quit  E-cigarettes or smokeless tobacco still contain nicotine  Talk to your healthcare provider before you use these products  · Choose a variety of healthy foods as often as possible  Include fresh, frozen, or canned fruits and vegetables  Also include low-fat dairy products, fish, chicken (without skin), and lean meats  Your healthcare provider or a dietitian can help you create meal plans  You may need to avoid certain foods or drinks if your pain is caused by a digestion problem  · Lower your sodium (salt) intake  Limit foods that are high in sodium, such as canned foods, salty snacks, and cold cuts  If you add salt when you cook food, do not add more at the table  Choose low-sodium canned foods as much as possible  · Drink plenty of water every day  Water helps your body to control your temperature and blood pressure  Ask your healthcare provider how much water you should drink every day  · Ask about activity    Your healthcare provider will tell you which activities to limit or avoid  Ask when you can drive, return to work, and have sex  Ask about the best exercise plan for you  · Maintain a healthy weight  Ask your healthcare provider what a healthy weight is for you  Ask him or her to help you create a safe weight loss plan if you are overweight  · Ask about vaccines you may need  Get the influenza (flu) vaccine every year as soon as recommended, usually in September or October  You may also need a pneumococcal vaccine to prevent pneumonia  The vaccine is usually given every 5 years, starting at age 72  Your healthcare provider can tell you if should get other vaccines, and when to get them  Follow up with your healthcare provider within 72 hours, or as directed: You may need to return for more tests to find the cause of your chest pain  You may be referred to a specialist, such as a cardiologist or gastroenterologist  Write down your questions so you remember to ask them during your visits  © Copyright [address] 2022 Information is for End User's use only and may not be sold, redistributed or otherwise used for commercial purposes  All illustrations and images included in CareNotes® are the copyrighted property of [employer]  or [facility] Sara Mcelroy   The above information is an  only  It is not intended as medical advice for individual conditions or treatments  Talk to your doctor, nurse or pharmacist before following any medical regimen to see if it is safe and effective for you

## 2022-04-01 NOTE — LETTER
April 1, 2022     Patient: Lisa Oliver   YOB: 2000   Date of Visit: 4/1/2022       To Whom it May Concern:    Elías Elizabeth was seen in my clinic on 4/1/2022  He may return to work on 04/04/2022  If you have any questions or concerns, please don't hesitate to call           Sincerely,          SOPHIA Kirk        CC: No Recipients

## 2023-10-31 ENCOUNTER — OFFICE VISIT (OUTPATIENT)
Dept: URGENT CARE | Facility: MEDICAL CENTER | Age: 23
End: 2023-10-31
Payer: COMMERCIAL

## 2023-10-31 VITALS
HEART RATE: 124 BPM | HEIGHT: 71 IN | RESPIRATION RATE: 18 BRPM | DIASTOLIC BLOOD PRESSURE: 69 MMHG | WEIGHT: 190 LBS | TEMPERATURE: 98.5 F | OXYGEN SATURATION: 96 % | BODY MASS INDEX: 26.6 KG/M2 | SYSTOLIC BLOOD PRESSURE: 135 MMHG
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
## 2023-10-31 DIAGNOSIS — S30.861A TICK BITE OF ABDOMEN, INITIAL ENCOUNTER: Primary | ICD-10-CM

## 2023-10-31 PROCEDURE — 99213 OFFICE O/P EST LOW 20 MIN: CPT | Performed by: PHYSICIAN ASSISTANT

## 2023-10-31 RX ORDER — DOXYCYCLINE 100 MG/1
200 TABLET ORAL ONCE
Qty: 2 TABLET | Refills: 0 | Status: SHIPPED | OUTPATIENT
Start: 2023-10-31 | End: 2023-10-31

## 2023-10-31 NOTE — PROGRESS NOTES
[facility]        NAME: Lindsay Simpson is a 20 y.o. male  : 2000    MRN: [id number]  DATE: 2023  TIME: 2:34 PM    Assessment and Plan   Tick bite of abdomen, initial encounter [H24.848H, W57. XXXA]  1. Tick bite of abdomen, initial encounter  doxycycline (ADOXA) 100 MG tablet            Patient Instructions     Take antibiotic as directed today  Okay to apply antibiotic ointment  Follow up if redness spreads or the area becomes painful   Follow up with PCP in 3-5 days. Proceed to  ER if symptoms worsen. Chief Complaint     Chief Complaint   Patient presents with    Tick Bite     Pt. Removed a tick form his left lower abdomen 3 days ago. Would like area checked. History of Present Illness       HPI  19 y/o male presents for evaluation of tick bite. He states he noticed the tick 3 days ago - he suspects it was attached 1-2 days as he was hunting. He describes the tick as a deer tick and it was not engorged. He removed the tick himself but has noted some redness around the site and wanted to have it check. He denies fever/myalgias/fatigue. Review of Systems   Review of Systems   Constitutional:  Positive for fatigue. Negative for chills and fever. HENT:  Negative for ear pain and sore throat. Eyes:  Negative for pain and visual disturbance. Respiratory:  Negative for cough and shortness of breath. Cardiovascular:  Negative for chest pain and palpitations. Gastrointestinal:  Negative for abdominal pain and vomiting. Genitourinary:  Negative for dysuria and hematuria. Musculoskeletal:  Negative for arthralgias, back pain and myalgias. Skin:  Positive for wound. Negative for color change and rash. Neurological:  Negative for seizures and syncope. All other systems reviewed and are negative.         Current Medications       Current Outpatient Medications:     doxycycline (ADOXA) 100 MG tablet, Take 2 tablets (200 mg total) by mouth 1 (one) time for 1 dose, Disp: 2 tablet, Rfl: 0    acetaminophen (TYLENOL) 500 mg tablet, Take one tablet the day of surgery, then take one tablet for breakfast lunch and dinner after surgery for 5 days (Patient not taking: Reported on 5/19/2021), Disp: 30 tablet, Rfl: 0    cimetidine (TAGAMET) 800 MG tablet, , Disp: , Rfl:     ibuprofen (MOTRIN) 600 mg tablet, Take one tablet the day of surgery, then take one tablet for breakfast lunch and dinner after surgery for 5 days (Patient not taking: Reported on 5/19/2021), Disp: 30 tablet, Rfl: 0    Current Allergies     Allergies as of 10/31/2023    (No Known Allergies)            The following portions of the patient's history were reviewed and updated as appropriate: allergies, current medications, past family history, past medical history, past social history, past surgical history and problem list.     Past Medical History:   Diagnosis Date    Seasonal allergies        Past Surgical History:   Procedure Laterality Date    MT OPEN TX METACARPAL FRACTURE SINGLE EA BONE Left 8/5/2020    Procedure: OPEN REDUCTION W/ INTERNAL FIXATION (ORIF) left hand 3rd metacarpal;  Surgeon: Stewart Acevedo MD;  Location: [facility];  Service: Orthopedics    MT REMOVAL DEEP IMPLANT Left 10/21/2020    Procedure: REMOVAL HARDWARE left 3rd metacarpal;  Surgeon: Stewart Acevedo MD;  Location: [facility];  Service: Orthopedics       No family history on file. Medications have been verified. Objective   /69   Pulse (!) 124   Temp 98.5 °F (36.9 °C)   Resp 18   Ht 5' 11" (1.803 m)   Wt 86.2 kg (190 lb)   SpO2 96%   BMI 26.50 kg/m²   No LMP for male patient. Physical Exam     Physical Exam  Vitals and nursing note reviewed. Constitutional:       Appearance: Normal appearance. He is not ill-appearing. HENT:      Head: Normocephalic and atraumatic. Nose: Nose normal.   Cardiovascular:      Rate and Rhythm: Normal rate and regular rhythm. Pulses: Normal pulses.       Heart sounds: Normal heart sounds. Pulmonary:      Effort: Pulmonary effort is normal.      Breath sounds: Normal breath sounds. Skin:     General: Skin is warm and dry. Comments: Left lower abdomen:  3 mm erythematous/purple hued area around the site of the tick bite. No induration/no fluctuance. No visible retained tick parts. No erythema chronicum migrans visible on chest/abdomen/back. He denies rash on arms/legs. Neurological:      Mental Status: He is alert and oriented to person, place, and time.    Psychiatric:         Mood and Affect: Mood normal.         Behavior: Behavior normal.

## 2023-10-31 NOTE — PATIENT INSTRUCTIONS
Take antibiotic as directed today  Okay to apply antibiotic ointment  Follow up if redness spreads or the area becomes painful   Follow up with PCP in 3-5 days. Proceed to  ER if symptoms worsen.

## 2024-04-07 ENCOUNTER — OFFICE VISIT (OUTPATIENT)
Dept: URGENT CARE | Facility: CLINIC | Age: 24
End: 2024-04-07
Payer: COMMERCIAL

## 2024-04-07 VITALS — HEART RATE: 109 BPM | OXYGEN SATURATION: 98 % | TEMPERATURE: 98.5 F | RESPIRATION RATE: 18 BRPM

## 2024-04-07 DIAGNOSIS — N50.819 PAIN IN TESTICLE, UNSPECIFIED LATERALITY: Primary | ICD-10-CM

## 2024-04-07 PROCEDURE — 99213 OFFICE O/P EST LOW 20 MIN: CPT

## 2024-04-07 NOTE — PROGRESS NOTES
[facility]    NAME: Paras Pastor is a 23 y.o. male  : 2000    MRN: [id number]  DATE: 2024  TIME: 2:11 PM    Assessment and Plan   Pain in testicle, unspecified laterality [N50.819]  1. Pain in testicle, unspecified laterality  Transfer to other facility        Unclear reason for symptoms, UA negative. No hematuria/CVA pain to suggest kidney stone. Given setting  exam is deferred.  Referred to ER for additional workup/consider ultrasound.   Follow up with primary care in 3-5 days.  Go to ER if symptoms get worse.     Patient Instructions     Go to the ER for additional workup.     Chief Complaint     Chief Complaint   Patient presents with    Possible UTI     Symptoms started last night. States he feels bladder pressure. States also feels pressure to under penis and scrotum          History of Present Illness       Presents with symptoms that began last night including bladder pressure. He notes pressure under the shaft of his penis and under scrotum. Denies symptoms like this before. Denies nausea/vomiting.         Review of Systems   Review of Systems   Constitutional:  Negative for fever.   Respiratory:  Negative for shortness of breath.    Cardiovascular:  Negative for chest pain.   Gastrointestinal:  Negative for nausea and vomiting.   Genitourinary:  Positive for dysuria and testicular pain. Negative for frequency and hematuria.         Current Medications       Current Outpatient Medications:     acetaminophen (TYLENOL) 500 mg tablet, Take one tablet the day of surgery, then take one tablet for breakfast lunch and dinner after surgery for 5 days (Patient not taking: Reported on 2021), Disp: 30 tablet, Rfl: 0    cimetidine (TAGAMET) 800 MG tablet, , Disp: , Rfl:     ibuprofen (MOTRIN) 600 mg tablet, Take one tablet the day of surgery, then take one tablet for breakfast lunch and dinner after surgery for 5 days (Patient not taking: Reported on 2021), Disp: 30 tablet,  Rfl: 0    Current Allergies     Allergies as of 04/07/2024    (No Known Allergies)            The following portions of the patient's history were reviewed and updated as appropriate: allergies, current medications, past family history, past medical history, past social history, past surgical history and problem list.     Past Medical History:   Diagnosis Date    Seasonal allergies        Past Surgical History:   Procedure Laterality Date    MI OPEN TX METACARPAL FRACTURE SINGLE EA BONE Left 8/5/2020    Procedure: OPEN REDUCTION W/ INTERNAL FIXATION (ORIF) left hand 3rd metacarpal;  Surgeon: Alfredo Mcgill MD;  Location: [facility];  Service: Orthopedics    MI REMOVAL IMPLANT DEEP Left 10/21/2020    Procedure: REMOVAL HARDWARE left 3rd metacarpal;  Surgeon: Alfredo Mcgill MD;  Location: [facility];  Service: Orthopedics       History reviewed. No pertinent family history.      Medications have been verified.        Objective   Pulse (!) 109   Temp 98.5 °F (36.9 °C)   Resp 18   SpO2 98%        Physical Exam     Physical Exam  Vitals reviewed.   Constitutional:       Appearance: Normal appearance.   Cardiovascular:      Rate and Rhythm: Normal rate and regular rhythm.      Pulses: Normal pulses.      Heart sounds: Normal heart sounds. No murmur heard.  Pulmonary:      Effort: Pulmonary effort is normal. No respiratory distress.      Breath sounds: Normal breath sounds.   Abdominal:      General: Bowel sounds are normal. There is no distension.      Palpations: Abdomen is soft.      Tenderness: There is no abdominal tenderness. There is no right CVA tenderness, left CVA tenderness, guarding or rebound.   Skin:     General: Skin is warm and dry.      Capillary Refill: Capillary refill takes less than 2 seconds.   Neurological:      General: No focal deficit present.      Mental Status: He is alert and oriented to person, place, and time.   Psychiatric:         Mood and Affect: Mood normal.         Behavior: Behavior  normal.

## 2025-04-22 ENCOUNTER — OFFICE VISIT (OUTPATIENT)
Dept: URGENT CARE | Facility: MEDICAL CENTER | Age: 25
End: 2025-04-22
Payer: COMMERCIAL

## 2025-04-22 VITALS
WEIGHT: 193 LBS | SYSTOLIC BLOOD PRESSURE: 136 MMHG | OXYGEN SATURATION: 97 % | DIASTOLIC BLOOD PRESSURE: 66 MMHG | HEART RATE: 87 BPM | BODY MASS INDEX: 27.02 KG/M2 | TEMPERATURE: 98 F | RESPIRATION RATE: 18 BRPM | HEIGHT: 71 IN

## 2025-04-22 DIAGNOSIS — J06.9 ACUTE URI: Primary | ICD-10-CM

## 2025-04-22 PROCEDURE — 99213 OFFICE O/P EST LOW 20 MIN: CPT | Performed by: PHYSICIAN ASSISTANT

## 2025-04-22 RX ORDER — ALBUTEROL SULFATE 90 UG/1
2 INHALANT RESPIRATORY (INHALATION) EVERY 6 HOURS PRN
Qty: 8.5 G | Refills: 0 | Status: SHIPPED | OUTPATIENT
Start: 2025-04-22

## 2025-04-22 RX ORDER — BENZONATATE 100 MG/1
100 CAPSULE ORAL 3 TIMES DAILY PRN
Qty: 20 CAPSULE | Refills: 0 | Status: SHIPPED | OUTPATIENT
Start: 2025-04-22

## 2025-04-22 NOTE — PATIENT INSTRUCTIONS
Upper respiratory infection  Tessalon Perles as needed for cough  Proventil 2 puffs every 6 hours as needed  Follow up with PCP in 3-5 days.  Proceed to  ER if symptoms worsen.

## 2025-04-22 NOTE — LETTER
April 22, 2025     Patient: Paras Pastor   YOB: 2000   Date of Visit: 4/22/2025       To Whom it May Concern:    Paras Pastor was seen in my clinic on 4/22/2025. He may return to work on 4/23/2025 .    If you have any questions or concerns, please don't hesitate to call.         Sincerely,          Adan Castillo PA-C        CC: No Recipients

## 2025-04-22 NOTE — PROGRESS NOTES
[facility]        NAME: Paras Pastor is a 24 y.o. male  : 2000    MRN: [id number]  DATE: 2025  TIME: 12:06 PM    Assessment and Plan   Acute URI [J06.9]  1. Acute URI  benzonatate (TESSALON PERLES) 100 mg capsule    albuterol (ProAir HFA) 90 mcg/act inhaler            Patient Instructions     Upper respiratory infection  Tessalon Perles as needed for cough  Proventil 2 puffs every 6 hours as needed  Follow up with PCP in 3-5 days.  Proceed to  ER if symptoms worsen.    Chief Complaint     Chief Complaint   Patient presents with    Cold Like Symptoms     Started couple days ago with cough, ears, sore throat, today chest tightness from coughing and wheezing.         History of Present Illness       24-year-old male who presents complaining of cough, congestion, runny nose x 3 days and having felt like his chest was slightly tight today.  Patient states that he has used a an albuterol inhaler in the past as a child but not since.  Denies fever, chills, chest pain, shortness of breath.        Review of Systems   Review of Systems   Constitutional: Negative.    HENT:  Positive for congestion.    Eyes: Negative.    Respiratory:  Positive for cough. Negative for apnea, choking, chest tightness, shortness of breath, wheezing and stridor.    Cardiovascular: Negative.  Negative for chest pain.         Current Medications       Current Outpatient Medications:     albuterol (ProAir HFA) 90 mcg/act inhaler, Inhale 2 puffs every 6 (six) hours as needed for wheezing, Disp: 8.5 g, Rfl: 0    benzonatate (TESSALON PERLES) 100 mg capsule, Take 1 capsule (100 mg total) by mouth 3 (three) times a day as needed for cough, Disp: 20 capsule, Rfl: 0    acetaminophen (TYLENOL) 500 mg tablet, Take one tablet the day of surgery, then take one tablet for breakfast lunch and dinner after surgery for 5 days (Patient not taking: Reported on 2025), Disp: 30 tablet, Rfl: 0    cimetidine (TAGAMET) 800 MG tablet,  ", Disp: , Rfl:     ibuprofen (MOTRIN) 600 mg tablet, Take one tablet the day of surgery, then take one tablet for breakfast lunch and dinner after surgery for 5 days (Patient not taking: Reported on 4/22/2025), Disp: 30 tablet, Rfl: 0    Current Allergies     Allergies as of 04/22/2025    (No Known Allergies)            The following portions of the patient's history were reviewed and updated as appropriate: allergies, current medications, past family history, past medical history, past social history, past surgical history and problem list.     Past Medical History:   Diagnosis Date    Seasonal allergies        Past Surgical History:   Procedure Laterality Date    NH OPEN TX METACARPAL FRACTURE SINGLE EA BONE Left 8/5/2020    Procedure: OPEN REDUCTION W/ INTERNAL FIXATION (ORIF) left hand 3rd metacarpal;  Surgeon: Alfredo Mcgill MD;  Location: [facility];  Service: Orthopedics    NH REMOVAL IMPLANT DEEP Left 10/21/2020    Procedure: REMOVAL HARDWARE left 3rd metacarpal;  Surgeon: Alfredo Mcgill MD;  Location: [facility];  Service: Orthopedics       No family history on file.      Medications have been verified.        Objective   /66   Pulse 87   Temp 98 °F (36.7 °C)   Resp 18   Ht 5' 11\" (1.803 m)   Wt 87.5 kg (193 lb)   SpO2 97%   BMI 26.92 kg/m²        Physical Exam     Physical Exam  Constitutional:       General: He is not in acute distress.     Appearance: Normal appearance. He is well-developed. He is not diaphoretic.   HENT:      Head: Normocephalic and atraumatic.      Right Ear: Hearing, tympanic membrane, ear canal and external ear normal.      Left Ear: Hearing, tympanic membrane, ear canal and external ear normal.      Nose: Rhinorrhea present.      Right Sinus: No maxillary sinus tenderness or frontal sinus tenderness.      Left Sinus: No maxillary sinus tenderness or frontal sinus tenderness.      Mouth/Throat:      Pharynx: Uvula midline.   Cardiovascular:      Rate and Rhythm: Normal " rate and regular rhythm.      Pulses: Normal pulses.      Heart sounds: Normal heart sounds.   Pulmonary:      Effort: Pulmonary effort is normal. No respiratory distress.      Breath sounds: Normal breath sounds. No stridor. No wheezing, rhonchi or rales.   Chest:      Chest wall: No tenderness.   Musculoskeletal:      Cervical back: Normal range of motion and neck supple.   Lymphadenopathy:      Cervical: No cervical adenopathy.   Neurological:      Mental Status: He is alert.

## (undated) DEVICE — ACE WRAP 3 IN UNSTERILE

## (undated) DEVICE — .040" PLATE TACK: Brand: ACUMED

## (undated) DEVICE — PAD CAST 3 IN COTTON NON STRL

## (undated) DEVICE — SPONGE SCRUB 4 PCT CHLORHEXIDINE

## (undated) DEVICE — GAUZE SPONGES,16 PLY: Brand: CURITY

## (undated) DEVICE — GLOVE SRG BIOGEL 8

## (undated) DEVICE — ALUMI-HAND POSITIONER LG

## (undated) DEVICE — .035" X 5.75" ST GUIDE WIRE
Type: IMPLANTABLE DEVICE | Site: HAND | Status: NON-FUNCTIONAL
Brand: ACUMED
Removed: 2020-08-05

## (undated) DEVICE — CURITY NON-ADHERENT STRIPS: Brand: CURITY

## (undated) DEVICE — SUT ETHIBOND 3-0 RB-1 30 IN MX552

## (undated) DEVICE — STRETCH BANDAGE: Brand: CURITY

## (undated) DEVICE — DRAPE KIT C-ARM W/PLATE PRTC FOOT SWITCH COVER

## (undated) DEVICE — SPLINT 4 X 15 IN FAST SET PLASTER

## (undated) DEVICE — SUT ETHILON 4-0 PS-2 18 IN 1667H

## (undated) DEVICE — BANDAGE, ESMARK LF STR 6"X9' (20/CS): Brand: CYPRESS

## (undated) DEVICE — NEEDLE 25G X 1 1/2

## (undated) DEVICE — BRUSH EZ SCRUB PCMX W/NAIL CLEANER

## (undated) DEVICE — PENCIL ELECTROSURG E-Z CLEAN -0035H

## (undated) DEVICE — PAD GROUNDING ADULT

## (undated) DEVICE — TUBING SUCTION 5MM X 12 FT

## (undated) DEVICE — 2.0 MM X 3.5" QR DRILL: Brand: ACUMED

## (undated) DEVICE — LIGHT HANDLE COVER SLEEVE DISP BLUE STELLAR

## (undated) DEVICE — ARM SLING: Brand: DEROYAL

## (undated) DEVICE — INTENDED FOR TISSUE SEPARATION, AND OTHER PROCEDURES THAT REQUIRE A SHARP SURGICAL BLADE TO PUNCTURE OR CUT.: Brand: BARD-PARKER ® CARBON RIB-BACK BLADES

## (undated) DEVICE — STERILE BETHLEHEM PLASTIC HAND: Brand: CARDINAL HEALTH

## (undated) DEVICE — COBAN 2 IN UNSTERILE